# Patient Record
Sex: FEMALE | Race: WHITE | NOT HISPANIC OR LATINO
[De-identification: names, ages, dates, MRNs, and addresses within clinical notes are randomized per-mention and may not be internally consistent; named-entity substitution may affect disease eponyms.]

---

## 2018-08-28 ENCOUNTER — RX RENEWAL (OUTPATIENT)
Age: 53
End: 2018-08-28

## 2018-09-20 ENCOUNTER — RX RENEWAL (OUTPATIENT)
Age: 53
End: 2018-09-20

## 2018-10-30 ENCOUNTER — APPOINTMENT (OUTPATIENT)
Dept: HEART AND VASCULAR | Facility: CLINIC | Age: 53
End: 2018-10-30
Payer: COMMERCIAL

## 2018-10-30 VITALS — SYSTOLIC BLOOD PRESSURE: 130 MMHG | DIASTOLIC BLOOD PRESSURE: 80 MMHG

## 2018-10-30 PROCEDURE — 99211 OFF/OP EST MAY X REQ PHY/QHP: CPT | Mod: 25

## 2018-10-30 PROCEDURE — 36415 COLL VENOUS BLD VENIPUNCTURE: CPT

## 2018-10-31 ENCOUNTER — MED ADMIN CHARGE (OUTPATIENT)
Age: 53
End: 2018-10-31

## 2018-10-31 LAB
ALBUMIN SERPL ELPH-MCNC: 4.4 G/DL
ALP BLD-CCNC: 163 U/L
ALT SERPL-CCNC: 37 U/L
ANION GAP SERPL CALC-SCNC: 15 MMOL/L
AST SERPL-CCNC: 25 U/L
BASOPHILS # BLD AUTO: 0.02 K/UL
BASOPHILS NFR BLD AUTO: 0.3 %
BILIRUB SERPL-MCNC: 0.5 MG/DL
BUN SERPL-MCNC: 15 MG/DL
CALCIUM SERPL-MCNC: 9.9 MG/DL
CHLORIDE SERPL-SCNC: 100 MMOL/L
CHOLEST SERPL-MCNC: 291 MG/DL
CHOLEST/HDLC SERPL: 4.9 RATIO
CO2 SERPL-SCNC: 26 MMOL/L
CREAT SERPL-MCNC: 0.89 MG/DL
EOSINOPHIL # BLD AUTO: 0.27 K/UL
EOSINOPHIL NFR BLD AUTO: 4.6 %
GLUCOSE SERPL-MCNC: 98 MG/DL
HBA1C MFR BLD HPLC: 5.9 %
HBV SURFACE AB SER QL: NONREACTIVE
HCT VFR BLD CALC: 43.6 %
HDLC SERPL-MCNC: 60 MG/DL
HGB BLD-MCNC: 14.1 G/DL
IMM GRANULOCYTES NFR BLD AUTO: 0.2 %
LDLC SERPL CALC-MCNC: 180 MG/DL
LYMPHOCYTES # BLD AUTO: 2.46 K/UL
LYMPHOCYTES NFR BLD AUTO: 41.8 %
MAN DIFF?: NORMAL
MCHC RBC-ENTMCNC: 29.3 PG
MCHC RBC-ENTMCNC: 32.3 GM/DL
MCV RBC AUTO: 90.5 FL
MONOCYTES # BLD AUTO: 0.46 K/UL
MONOCYTES NFR BLD AUTO: 7.8 %
NEUTROPHILS # BLD AUTO: 2.66 K/UL
NEUTROPHILS NFR BLD AUTO: 45.3 %
PLATELET # BLD AUTO: 230 K/UL
POTASSIUM SERPL-SCNC: 4.2 MMOL/L
PROT SERPL-MCNC: 6.8 G/DL
RBC # BLD: 4.82 M/UL
RBC # FLD: 13.5 %
SODIUM SERPL-SCNC: 141 MMOL/L
T3 SERPL-MCNC: 102 NG/DL
T3FREE SERPL-MCNC: 2.94 PG/ML
T4 FREE SERPL-MCNC: 1 NG/DL
T4 SERPL-MCNC: 6.8 UG/DL
TRIGL SERPL-MCNC: 256 MG/DL
TSH SERPL-ACNC: 2.59 UIU/ML
WBC # FLD AUTO: 5.88 K/UL

## 2018-11-02 ENCOUNTER — OTHER (OUTPATIENT)
Age: 53
End: 2018-11-02

## 2018-11-02 LAB
AMPHET UR-MCNC: NEGATIVE
BARBITURATES UR-MCNC: NEGATIVE
BENZODIAZ UR-MCNC: NEGATIVE
COCAINE METAB.OTHER UR-MCNC: NEGATIVE
CREATININE, URINE: 114.5 MG/DL
M TB IFN-G BLD-IMP: NEGATIVE
METHADONE UR-MCNC: NEGATIVE
METHAQUALONE UR-MCNC: NEGATIVE
OPIATES UR-MCNC: NEGATIVE
PCP UR-MCNC: NEGATIVE
PROPOXYPH UR QL: NEGATIVE
QUANTIFERON TB PLUS MITOGEN MINUS NIL: 6.85 IU/ML
QUANTIFERON TB PLUS NIL: 0.02 IU/ML
QUANTIFERON TB PLUS TB1 MINUS NIL: 0.1 IU/ML
QUANTIFERON TB PLUS TB2 MINUS NIL: 0.08 IU/ML
THC UR QL: NEGATIVE

## 2018-11-20 ENCOUNTER — RX RENEWAL (OUTPATIENT)
Age: 53
End: 2018-11-20

## 2018-11-21 ENCOUNTER — RX RENEWAL (OUTPATIENT)
Age: 53
End: 2018-11-21

## 2018-12-28 ENCOUNTER — APPOINTMENT (OUTPATIENT)
Dept: HEART AND VASCULAR | Facility: CLINIC | Age: 53
End: 2018-12-28

## 2019-01-02 ENCOUNTER — APPOINTMENT (OUTPATIENT)
Dept: HEART AND VASCULAR | Facility: CLINIC | Age: 54
End: 2019-01-02

## 2019-01-04 ENCOUNTER — APPOINTMENT (OUTPATIENT)
Dept: HEART AND VASCULAR | Facility: CLINIC | Age: 54
End: 2019-01-04
Payer: COMMERCIAL

## 2019-01-04 VITALS — SYSTOLIC BLOOD PRESSURE: 132 MMHG | HEART RATE: 74 BPM | DIASTOLIC BLOOD PRESSURE: 80 MMHG

## 2019-01-04 PROCEDURE — 99213 OFFICE O/P EST LOW 20 MIN: CPT

## 2019-01-04 NOTE — PHYSICAL EXAM
[General Appearance - Well Developed] : well developed [Normal Appearance] : normal appearance [Well Groomed] : well groomed [General Appearance - Well Nourished] : well nourished [No Deformities] : no deformities [General Appearance - In No Acute Distress] : no acute distress [] : no respiratory distress [Respiration, Rhythm And Depth] : normal respiratory rhythm and effort [Exaggerated Use Of Accessory Muscles For Inspiration] : no accessory muscle use [Auscultation Breath Sounds / Voice Sounds] : lungs were clear to auscultation bilaterally [Heart Rate And Rhythm] : heart rate and rhythm were normal [Heart Sounds] : normal S1 and S2 [Murmurs] : no murmurs present

## 2019-01-04 NOTE — DISCUSSION/SUMMARY
[FreeTextEntry1] : No changes to medication regimen at this time.\par Pt to maintain low fat, low sodium diet, weight loss and exercise encouraged. \par FU with Dr Hallman in 4 months.\par

## 2019-01-04 NOTE — HISTORY OF PRESENT ILLNESS
[FreeTextEntry1] : 52yo female pt seen for FU of HTN. Pt denies c/o CP,palpitations, SOB, TYSON, nausea, vomiting, fatigue, unintentional weight loss. Pt takes all he medications as prescribed.

## 2019-01-07 ENCOUNTER — RX RENEWAL (OUTPATIENT)
Age: 54
End: 2019-01-07

## 2019-01-07 DIAGNOSIS — K21.9 GASTRO-ESOPHAGEAL REFLUX DISEASE W/OUT ESOPHAGITIS: ICD-10-CM

## 2019-02-06 ENCOUNTER — LABORATORY RESULT (OUTPATIENT)
Age: 54
End: 2019-02-06

## 2019-02-06 ENCOUNTER — APPOINTMENT (OUTPATIENT)
Dept: HEART AND VASCULAR | Facility: CLINIC | Age: 54
End: 2019-02-06

## 2019-02-06 ENCOUNTER — APPOINTMENT (OUTPATIENT)
Dept: HEART AND VASCULAR | Facility: CLINIC | Age: 54
End: 2019-02-06
Payer: COMMERCIAL

## 2019-02-06 VITALS
BODY MASS INDEX: 40.75 KG/M2 | HEART RATE: 72 BPM | DIASTOLIC BLOOD PRESSURE: 80 MMHG | HEIGHT: 63 IN | RESPIRATION RATE: 14 BRPM | WEIGHT: 230 LBS | SYSTOLIC BLOOD PRESSURE: 130 MMHG

## 2019-02-06 DIAGNOSIS — Z82.49 FAMILY HISTORY OF ISCHEMIC HEART DISEASE AND OTHER DISEASES OF THE CIRCULATORY SYSTEM: ICD-10-CM

## 2019-02-06 DIAGNOSIS — Z85.43 PERSONAL HISTORY OF MALIGNANT NEOPLASM OF OVARY: ICD-10-CM

## 2019-02-06 DIAGNOSIS — Z78.9 OTHER SPECIFIED HEALTH STATUS: ICD-10-CM

## 2019-02-06 DIAGNOSIS — J45.909 UNSPECIFIED ASTHMA, UNCOMPLICATED: ICD-10-CM

## 2019-02-06 DIAGNOSIS — Z83.3 FAMILY HISTORY OF DIABETES MELLITUS: ICD-10-CM

## 2019-02-06 PROCEDURE — 93000 ELECTROCARDIOGRAM COMPLETE: CPT

## 2019-02-06 PROCEDURE — 93306 TTE W/DOPPLER COMPLETE: CPT

## 2019-02-06 PROCEDURE — 36415 COLL VENOUS BLD VENIPUNCTURE: CPT

## 2019-02-06 PROCEDURE — 99214 OFFICE O/P EST MOD 30 MIN: CPT

## 2019-02-06 NOTE — PHYSICAL EXAM
[General Appearance - Well Developed] : well developed [Normal Appearance] : normal appearance [Well Groomed] : well groomed [General Appearance - Well Nourished] : well nourished [No Deformities] : no deformities [General Appearance - In No Acute Distress] : no acute distress [Normal Conjunctiva] : the conjunctiva exhibited no abnormalities [Eyelids - No Xanthelasma] : the eyelids demonstrated no xanthelasmas [Normal Oral Mucosa] : normal oral mucosa [No Oral Pallor] : no oral pallor [No Oral Cyanosis] : no oral cyanosis [Normal Jugular Venous A Waves Present] : normal jugular venous A waves present [Normal Jugular Venous V Waves Present] : normal jugular venous V waves present [No Jugular Venous Veliz A Waves] : no jugular venous veliz A waves [Respiration, Rhythm And Depth] : normal respiratory rhythm and effort [Exaggerated Use Of Accessory Muscles For Inspiration] : no accessory muscle use [Auscultation Breath Sounds / Voice Sounds] : lungs were clear to auscultation bilaterally [Heart Rate And Rhythm] : heart rate and rhythm were normal [Heart Sounds] : normal S1 and S2 [Systolic grade ___/6] : A grade [unfilled]/6 systolic murmur was heard. [Diastolic Grade ___/4] : A grade [unfilled]/4 diastolic murmur was heard. [Abdomen Soft] : soft [Abdomen Tenderness] : non-tender [Abdomen Mass (___ Cm)] : no abdominal mass palpated [Abnormal Walk] : normal gait [Nail Clubbing] : no clubbing of the fingernails [Cyanosis, Localized] : no localized cyanosis [Petechial Hemorrhages (___cm)] : no petechial hemorrhages [Skin Color & Pigmentation] : normal skin color and pigmentation [] : no rash [No Venous Stasis] : no venous stasis [Skin Lesions] : no skin lesions [No Skin Ulcers] : no skin ulcer [No Xanthoma] : no  xanthoma was observed [Oriented To Time, Place, And Person] : oriented to person, place, and time [Affect] : the affect was normal [Mood] : the mood was normal [No Anxiety] : not feeling anxious

## 2019-02-06 NOTE — DISCUSSION/SUMMARY
[Possible Cardiac Ischemia (Intermd Prob)] : possible cardiac ischemia (intermediate probability) [Dobutamine Stress Test] : dobutamine stress test [Hyperlipidemia] : hyperlipidemia [Diet Modification] : diet modification [Hypertension] : hypertension [Stable] : stable [None] : none [Weight Loss] : weight loss [Sodium Restriction] : sodium restriction [Patient] : the patient [FreeTextEntry1] : RHD- Stable; no further intervention at this time.\par Chest Pain- Given echo with relatively little change (Normal LV fxn, Mild MS; mod MR) will r/o underlying\par ischemic CAD (cardiac risk factors noted). Pt. cannot walk inclined treadmill due to chronic back pain, cannot take adenosine due to underlying asthma-> Dobutamine nuc stress ordered. \par Blood work drawn- Await results.

## 2019-02-06 NOTE — HISTORY OF PRESENT ILLNESS
[FreeTextEntry1] : The Patient complains of intermittent left-sided chest pain for a little over a week. The pain is described as aching and squeezing and is radiating to left arm. The pain is both exertional and non-exertional and is aggravated by Stress and relieved by rest. The pain isn't  associated with SOB/nausea/vomiting/diaphoresis/dizziness/palpitations.\par Echo ordered to assess LV function/possible worsening of known rheumatic valvular heart disease.\par

## 2019-02-06 NOTE — REASON FOR VISIT
[Follow-Up - Clinic] : a clinic follow-up of [Chest Pain] : chest pain [Hyperlipidemia] : hyperlipidemia [Hypertension] : hypertension [FreeTextEntry1] : Rheumatic heart disease (MR/MS)

## 2019-02-07 LAB
ALBUMIN SERPL ELPH-MCNC: 4.7 G/DL
ALP BLD-CCNC: 191 U/L
ALT SERPL-CCNC: 39 U/L
ANION GAP SERPL CALC-SCNC: 16 MMOL/L
AST SERPL-CCNC: 25 U/L
BASOPHILS # BLD AUTO: 0.03 K/UL
BASOPHILS NFR BLD AUTO: 0.4 %
BILIRUB SERPL-MCNC: 0.6 MG/DL
BUN SERPL-MCNC: 15 MG/DL
CALCIUM SERPL-MCNC: 9.8 MG/DL
CHLORIDE SERPL-SCNC: 103 MMOL/L
CO2 SERPL-SCNC: 24 MMOL/L
CREAT SERPL-MCNC: 0.82 MG/DL
EOSINOPHIL # BLD AUTO: 0.31 K/UL
EOSINOPHIL NFR BLD AUTO: 4 %
GLUCOSE SERPL-MCNC: 92 MG/DL
HBA1C MFR BLD HPLC: 5.9 %
HCT VFR BLD CALC: 44.7 %
HGB BLD-MCNC: 14.1 G/DL
IMM GRANULOCYTES NFR BLD AUTO: 0.3 %
LYMPHOCYTES # BLD AUTO: 2.56 K/UL
LYMPHOCYTES NFR BLD AUTO: 32.7 %
MAN DIFF?: NORMAL
MCHC RBC-ENTMCNC: 28.5 PG
MCHC RBC-ENTMCNC: 31.5 GM/DL
MCV RBC AUTO: 90.5 FL
MONOCYTES # BLD AUTO: 0.51 K/UL
MONOCYTES NFR BLD AUTO: 6.5 %
NEUTROPHILS # BLD AUTO: 4.4 K/UL
NEUTROPHILS NFR BLD AUTO: 56.1 %
PLATELET # BLD AUTO: 254 K/UL
POTASSIUM SERPL-SCNC: 4.2 MMOL/L
PROT SERPL-MCNC: 6.7 G/DL
RBC # BLD: 4.94 M/UL
RBC # FLD: 14.2 %
SODIUM SERPL-SCNC: 143 MMOL/L
WBC # FLD AUTO: 7.83 K/UL

## 2019-02-11 LAB
T3 SERPL-MCNC: 111 NG/DL
T3FREE SERPL-MCNC: 2.75 PG/ML
T3RU NFR SERPL: 1.12 INDEX
T4 FREE SERPL-MCNC: 1 NG/DL
T4 SERPL-MCNC: 6.8 UG/DL
TSH SERPL-ACNC: 2.17 UIU/ML

## 2019-02-17 ENCOUNTER — APPOINTMENT (OUTPATIENT)
Dept: HEART AND VASCULAR | Facility: CLINIC | Age: 54
End: 2019-02-17

## 2019-02-21 LAB
CHOLEST SERPL-MCNC: 263 MG/DL
CHOLEST/HDLC SERPL: 3.5 RATIO
HDLC SERPL-MCNC: 76 MG/DL
LDLC SERPL CALC-MCNC: 148 MG/DL
TRIGL SERPL-MCNC: 196 MG/DL

## 2019-03-11 ENCOUNTER — RX RENEWAL (OUTPATIENT)
Age: 54
End: 2019-03-11

## 2019-03-13 ENCOUNTER — RX RENEWAL (OUTPATIENT)
Age: 54
End: 2019-03-13

## 2019-04-09 ENCOUNTER — RX RENEWAL (OUTPATIENT)
Age: 54
End: 2019-04-09

## 2019-07-22 ENCOUNTER — RX RENEWAL (OUTPATIENT)
Age: 54
End: 2019-07-22

## 2019-08-08 ENCOUNTER — APPOINTMENT (OUTPATIENT)
Dept: HEART AND VASCULAR | Facility: CLINIC | Age: 54
End: 2019-08-08

## 2019-08-14 ENCOUNTER — APPOINTMENT (OUTPATIENT)
Dept: HEART AND VASCULAR | Facility: CLINIC | Age: 54
End: 2019-08-14

## 2019-09-09 ENCOUNTER — RX RENEWAL (OUTPATIENT)
Age: 54
End: 2019-09-09

## 2019-09-09 RX ORDER — LOSARTAN POTASSIUM AND HYDROCHLOROTHIAZIDE 12.5; 5 MG/1; MG/1
50-12.5 TABLET ORAL DAILY
Qty: 30 | Refills: 3 | Status: DISCONTINUED | COMMUNITY
Start: 2018-08-28 | End: 2019-09-09

## 2019-09-10 RX ORDER — PANTOPRAZOLE 40 MG/1
40 TABLET, DELAYED RELEASE ORAL
Qty: 30 | Refills: 3 | Status: DISCONTINUED | COMMUNITY
Start: 2019-01-07 | End: 2019-09-10

## 2019-09-18 ENCOUNTER — RX RENEWAL (OUTPATIENT)
Age: 54
End: 2019-09-18

## 2019-09-18 RX ORDER — ESOMEPRAZOLE MAGNESIUM 20 MG/1
20 CAPSULE, DELAYED RELEASE ORAL DAILY
Qty: 180 | Refills: 3 | Status: COMPLETED | COMMUNITY
Start: 2019-09-10 | End: 2019-09-18

## 2019-09-19 ENCOUNTER — RX RENEWAL (OUTPATIENT)
Age: 54
End: 2019-09-19

## 2019-09-20 ENCOUNTER — RX RENEWAL (OUTPATIENT)
Age: 54
End: 2019-09-20

## 2019-10-07 ENCOUNTER — RX RENEWAL (OUTPATIENT)
Age: 54
End: 2019-10-07

## 2019-10-14 ENCOUNTER — LABORATORY RESULT (OUTPATIENT)
Age: 54
End: 2019-10-14

## 2019-10-15 ENCOUNTER — APPOINTMENT (OUTPATIENT)
Dept: HEART AND VASCULAR | Facility: CLINIC | Age: 54
End: 2019-10-15
Payer: COMMERCIAL

## 2019-10-15 VITALS
DIASTOLIC BLOOD PRESSURE: 90 MMHG | OXYGEN SATURATION: 100 % | SYSTOLIC BLOOD PRESSURE: 150 MMHG | BODY MASS INDEX: 40.22 KG/M2 | HEART RATE: 70 BPM | HEIGHT: 63 IN | WEIGHT: 227 LBS

## 2019-10-15 PROCEDURE — 99213 OFFICE O/P EST LOW 20 MIN: CPT | Mod: 25

## 2019-10-15 PROCEDURE — 36415 COLL VENOUS BLD VENIPUNCTURE: CPT

## 2019-10-15 NOTE — HISTORY OF PRESENT ILLNESS
[de-identified] : Pt presents for routine job physical, to FU on HTN, HLD, vit D deficiency.\par Pt denies CP, palpitations, fever, chills, SOB, difficulty breathing. 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

## 2019-10-15 NOTE — PLAN
[FreeTextEntry1] : HTN: pt is encouraged to continue taking medications as prescribed without interruptions. Pt will monitor her BP for 1 week and will keep a log.\par HLD: continue current medications as prescribed; maintain low fat/low sodium diet. \par Work form: BW obtained >> pending results

## 2019-10-17 ENCOUNTER — RX RENEWAL (OUTPATIENT)
Age: 54
End: 2019-10-17

## 2019-10-17 LAB
25(OH)D3 SERPL-MCNC: 16.1 NG/ML
ALBUMIN SERPL ELPH-MCNC: 4.3 G/DL
ALP BLD-CCNC: 162 U/L
ALT SERPL-CCNC: 48 U/L
AMPHET UR-MCNC: NEGATIVE
ANION GAP SERPL CALC-SCNC: 13 MMOL/L
AST SERPL-CCNC: 40 U/L
BARBITURATES UR-MCNC: NEGATIVE
BASOPHILS # BLD AUTO: 0.04 K/UL
BASOPHILS NFR BLD AUTO: 0.7 %
BENZODIAZ UR-MCNC: NEGATIVE
BILIRUB SERPL-MCNC: 0.5 MG/DL
BUN SERPL-MCNC: 13 MG/DL
CALCIUM SERPL-MCNC: 9.8 MG/DL
CHLORIDE SERPL-SCNC: 106 MMOL/L
CHOLEST SERPL-MCNC: 294 MG/DL
CHOLEST/HDLC SERPL: 4.8 RATIO
CO2 SERPL-SCNC: 22 MMOL/L
COCAINE METAB.OTHER UR-MCNC: NEGATIVE
CREAT SERPL-MCNC: 0.93 MG/DL
CREATININE, URINE: 36.5 MG/DL
EOSINOPHIL # BLD AUTO: 0.36 K/UL
EOSINOPHIL NFR BLD AUTO: 6.7 %
ESTIMATED AVERAGE GLUCOSE: 120 MG/DL
GLUCOSE SERPL-MCNC: 97 MG/DL
HBA1C MFR BLD HPLC: 5.8 %
HCT VFR BLD CALC: 42.3 %
HDLC SERPL-MCNC: 61 MG/DL
HGB BLD-MCNC: 13.6 G/DL
IMM GRANULOCYTES NFR BLD AUTO: 0.2 %
LDLC SERPL CALC-MCNC: 191 MG/DL
LYMPHOCYTES # BLD AUTO: 2.16 K/UL
LYMPHOCYTES NFR BLD AUTO: 40.4 %
M TB IFN-G BLD-IMP: NEGATIVE
MAN DIFF?: NORMAL
MCHC RBC-ENTMCNC: 28.9 PG
MCHC RBC-ENTMCNC: 32.2 GM/DL
MCV RBC AUTO: 89.8 FL
METHADONE UR-MCNC: NEGATIVE
METHAQUALONE UR-MCNC: NEGATIVE
MONOCYTES # BLD AUTO: 0.36 K/UL
MONOCYTES NFR BLD AUTO: 6.7 %
NEUTROPHILS # BLD AUTO: 2.41 K/UL
NEUTROPHILS NFR BLD AUTO: 45.3 %
OPIATES UR-MCNC: NEGATIVE
PCP UR-MCNC: NEGATIVE
PLATELET # BLD AUTO: 233 K/UL
POTASSIUM SERPL-SCNC: 4 MMOL/L
PROPOXYPH UR QL: NEGATIVE
PROT SERPL-MCNC: 6.4 G/DL
QUANTIFERON TB PLUS MITOGEN MINUS NIL: >10 IU/ML
QUANTIFERON TB PLUS NIL: 0.05 IU/ML
QUANTIFERON TB PLUS TB1 MINUS NIL: 0.02 IU/ML
QUANTIFERON TB PLUS TB2 MINUS NIL: 0 IU/ML
RBC # BLD: 4.71 M/UL
RBC # FLD: 13.4 %
SODIUM SERPL-SCNC: 141 MMOL/L
T3 SERPL-MCNC: 91 NG/DL
T4 FREE SERPL-MCNC: 0.9 NG/DL
T4 SERPL-MCNC: 5.2 UG/DL
THC UR QL: NEGATIVE
TRIGL SERPL-MCNC: 212 MG/DL
TSH SERPL-ACNC: 1.72 UIU/ML
WBC # FLD AUTO: 5.34 K/UL

## 2019-11-08 ENCOUNTER — RX RENEWAL (OUTPATIENT)
Age: 54
End: 2019-11-08

## 2019-11-26 ENCOUNTER — RX RENEWAL (OUTPATIENT)
Age: 54
End: 2019-11-26

## 2019-12-24 ENCOUNTER — RX RENEWAL (OUTPATIENT)
Age: 54
End: 2019-12-24

## 2020-01-14 ENCOUNTER — APPOINTMENT (OUTPATIENT)
Dept: HEART AND VASCULAR | Facility: CLINIC | Age: 55
End: 2020-01-14

## 2020-06-15 RX ORDER — FLUOROMETHOLONE 1 MG/G
0.1 OINTMENT OPHTHALMIC
Qty: 1 | Refills: 0 | Status: ACTIVE | COMMUNITY
Start: 2019-09-09 | End: 1900-01-01

## 2020-06-17 ENCOUNTER — RX RENEWAL (OUTPATIENT)
Age: 55
End: 2020-06-17

## 2020-09-16 ENCOUNTER — APPOINTMENT (OUTPATIENT)
Dept: HEART AND VASCULAR | Facility: CLINIC | Age: 55
End: 2020-09-16
Payer: COMMERCIAL

## 2020-09-16 ENCOUNTER — NON-APPOINTMENT (OUTPATIENT)
Age: 55
End: 2020-09-16

## 2020-09-16 VITALS
WEIGHT: 230 LBS | DIASTOLIC BLOOD PRESSURE: 88 MMHG | SYSTOLIC BLOOD PRESSURE: 150 MMHG | BODY MASS INDEX: 40.75 KG/M2 | HEIGHT: 63 IN

## 2020-09-16 DIAGNOSIS — F41.9 ANXIETY DISORDER, UNSPECIFIED: ICD-10-CM

## 2020-09-16 PROCEDURE — 99214 OFFICE O/P EST MOD 30 MIN: CPT | Mod: 25

## 2020-09-16 PROCEDURE — 36415 COLL VENOUS BLD VENIPUNCTURE: CPT

## 2020-09-16 PROCEDURE — 93000 ELECTROCARDIOGRAM COMPLETE: CPT

## 2020-09-16 RX ORDER — LOSARTAN POTASSIUM 50 MG/1
50 TABLET, FILM COATED ORAL
Qty: 90 | Refills: 3 | Status: DISCONTINUED | COMMUNITY
Start: 2019-09-09 | End: 2020-09-16

## 2020-09-17 LAB
25(OH)D3 SERPL-MCNC: 27.3 NG/ML
ALBUMIN SERPL ELPH-MCNC: 4.6 G/DL
ALP BLD-CCNC: 150 U/L
ALT SERPL-CCNC: 32 U/L
ANION GAP SERPL CALC-SCNC: 14 MMOL/L
APPEARANCE: CLEAR
AST SERPL-CCNC: 25 U/L
BACTERIA: NEGATIVE
BASOPHILS # BLD AUTO: 0.05 K/UL
BASOPHILS NFR BLD AUTO: 0.9 %
BILIRUB SERPL-MCNC: 0.6 MG/DL
BILIRUBIN URINE: NEGATIVE
BLOOD URINE: NEGATIVE
BUN SERPL-MCNC: 13 MG/DL
CALCIUM SERPL-MCNC: 9.7 MG/DL
CHLORIDE SERPL-SCNC: 104 MMOL/L
CHOLEST SERPL-MCNC: 290 MG/DL
CHOLEST/HDLC SERPL: 3.6 RATIO
CO2 SERPL-SCNC: 23 MMOL/L
COLOR: COLORLESS
CREAT SERPL-MCNC: 0.91 MG/DL
EOSINOPHIL # BLD AUTO: 0.27 K/UL
EOSINOPHIL NFR BLD AUTO: 4.8 %
ESTIMATED AVERAGE GLUCOSE: 114 MG/DL
FOLATE SERPL-MCNC: 3.8 NG/ML
GLUCOSE QUALITATIVE U: NEGATIVE
GLUCOSE SERPL-MCNC: 92 MG/DL
HBA1C MFR BLD HPLC: 5.6 %
HCT VFR BLD CALC: 45.5 %
HDLC SERPL-MCNC: 81 MG/DL
HGB BLD-MCNC: 14.5 G/DL
HYALINE CASTS: 0 /LPF
IMM GRANULOCYTES NFR BLD AUTO: 0.2 %
KETONES URINE: NEGATIVE
LDLC SERPL CALC-MCNC: 178 MG/DL
LEUKOCYTE ESTERASE URINE: NEGATIVE
LYMPHOCYTES # BLD AUTO: 1.96 K/UL
LYMPHOCYTES NFR BLD AUTO: 34.9 %
MAN DIFF?: NORMAL
MCHC RBC-ENTMCNC: 29.4 PG
MCHC RBC-ENTMCNC: 31.9 GM/DL
MCV RBC AUTO: 92.1 FL
MICROSCOPIC-UA: NORMAL
MONOCYTES # BLD AUTO: 0.43 K/UL
MONOCYTES NFR BLD AUTO: 7.7 %
NEUTROPHILS # BLD AUTO: 2.9 K/UL
NEUTROPHILS NFR BLD AUTO: 51.5 %
NITRITE URINE: NEGATIVE
PH URINE: 6.5
PLATELET # BLD AUTO: 225 K/UL
POTASSIUM SERPL-SCNC: 3.9 MMOL/L
PROT SERPL-MCNC: 6.4 G/DL
PROTEIN URINE: NEGATIVE
RBC # BLD: 4.94 M/UL
RBC # FLD: 14 %
RED BLOOD CELLS URINE: 1 /HPF
SARS-COV-2 IGG SERPL IA-ACNC: 0.08 INDEX
SARS-COV-2 IGG SERPL QL IA: NEGATIVE
SODIUM SERPL-SCNC: 141 MMOL/L
SPECIFIC GRAVITY URINE: 1.01
SQUAMOUS EPITHELIAL CELLS: 0 /HPF
T3 SERPL-MCNC: 84 NG/DL
T4 FREE SERPL-MCNC: 1 NG/DL
T4 SERPL-MCNC: 6.1 UG/DL
TRIGL SERPL-MCNC: 154 MG/DL
TSH SERPL-ACNC: 1.77 UIU/ML
UROBILINOGEN URINE: NORMAL
VIT B12 SERPL-MCNC: 564 PG/ML
WBC # FLD AUTO: 5.62 K/UL
WHITE BLOOD CELLS URINE: 0 /HPF

## 2020-09-20 LAB
AMPHET UR-MCNC: NEGATIVE
BACTERIA UR CULT: NORMAL
BARBITURATES UR-MCNC: NEGATIVE
BENZODIAZ UR-MCNC: NEGATIVE
COCAINE METAB.OTHER UR-MCNC: NEGATIVE
CREATININE, URINE: 27.5 MG/DL
M TB IFN-G BLD-IMP: NEGATIVE
METHADONE UR-MCNC: NEGATIVE
METHAQUALONE UR-MCNC: NEGATIVE
OPIATES UR-MCNC: NEGATIVE
PCP UR-MCNC: NEGATIVE
PROPOXYPH UR QL: NEGATIVE
QUANTIFERON TB PLUS MITOGEN MINUS NIL: 7.02 IU/ML
QUANTIFERON TB PLUS NIL: 0.04 IU/ML
QUANTIFERON TB PLUS TB1 MINUS NIL: 0 IU/ML
QUANTIFERON TB PLUS TB2 MINUS NIL: 0 IU/ML
THC UR QL: NEGATIVE

## 2020-09-22 NOTE — PLAN
[FreeTextEntry1] : HTN: continue medications as prescribed\par HLD: low fat/low sodium diet, weight loss and low impact exercise is discussed and encouraged. \par Vit D deficiency: continue supplementation\par I discussed with pt importance of hand washing, social distancing, wearing appropriate face covering at all times. \par Symptoms and implications of COVID 19 discussed with good understanding.

## 2020-09-22 NOTE — HISTORY OF PRESENT ILLNESS
[de-identified] : Pt presents for routine work physical, to FU on HTN, HLD, vit D deficiency.\par Pt denies CP, palpitations, fever, chills. Pt is overweight and has SOB with climbing stairs. \par Pt has good appetite; regular BMs; denies sleep alterations, cough, hemoptysis, night sweats, unintentional weight loss . \par Pt is up to date with annual mammograms; refused flu shot this season and opted to wearing the mask.\par

## 2020-12-31 ENCOUNTER — APPOINTMENT (OUTPATIENT)
Dept: HEART AND VASCULAR | Facility: CLINIC | Age: 55
End: 2020-12-31
Payer: COMMERCIAL

## 2020-12-31 ENCOUNTER — MED ADMIN CHARGE (OUTPATIENT)
Age: 55
End: 2020-12-31

## 2020-12-31 PROCEDURE — 99072 ADDL SUPL MATRL&STAF TM PHE: CPT

## 2020-12-31 PROCEDURE — 99212 OFFICE O/P EST SF 10 MIN: CPT | Mod: 25

## 2020-12-31 PROCEDURE — 90471 IMMUNIZATION ADMIN: CPT | Mod: 59

## 2020-12-31 PROCEDURE — 90682 RIV4 VACC RECOMBINANT DNA IM: CPT

## 2021-02-09 VITALS
SYSTOLIC BLOOD PRESSURE: 150 MMHG | HEIGHT: 63 IN | BODY MASS INDEX: 40.75 KG/M2 | WEIGHT: 230 LBS | DIASTOLIC BLOOD PRESSURE: 88 MMHG

## 2021-02-09 NOTE — PHYSICAL EXAM
[General Appearance - Well Developed] : well developed [Normal Appearance] : normal appearance [Well Groomed] : well groomed [General Appearance - Well Nourished] : well nourished [No Deformities] : no deformities [General Appearance - In No Acute Distress] : no acute distress [Normal Conjunctiva] : the conjunctiva exhibited no abnormalities [Eyelids - No Xanthelasma] : the eyelids demonstrated no xanthelasmas [Normal Oral Mucosa] : normal oral mucosa [No Oral Pallor] : no oral pallor [No Oral Cyanosis] : no oral cyanosis [Normal Jugular Venous A Waves Present] : normal jugular venous A waves present [Normal Jugular Venous V Waves Present] : normal jugular venous V waves present [No Jugular Venous Veliz A Waves] : no jugular venous veliz A waves [] : no respiratory distress [Respiration, Rhythm And Depth] : normal respiratory rhythm and effort [Exaggerated Use Of Accessory Muscles For Inspiration] : no accessory muscle use [Auscultation Breath Sounds / Voice Sounds] : lungs were clear to auscultation bilaterally [Heart Sounds] : normal S1 and S2 [Heart Rate And Rhythm] : heart rate and rhythm were normal [Murmurs] : no murmurs present

## 2021-02-09 NOTE — ASSESSMENT
[FreeTextEntry1] : pt seen for annual flu shot administration. Flu shot administered, no local reaction noted. Possible SE reviewed with the patient, good understanding verbalized.\par \par

## 2021-02-12 DIAGNOSIS — R07.9 CHEST PAIN, UNSPECIFIED: ICD-10-CM

## 2021-02-15 LAB
SARS-COV-2 IGG SERPL IA-ACNC: 0.06 INDEX
SARS-COV-2 IGG SERPL QL IA: NEGATIVE
SARS-COV-2 N GENE NPH QL NAA+PROBE: NOT DETECTED

## 2021-06-18 ENCOUNTER — APPOINTMENT (OUTPATIENT)
Dept: HEART AND VASCULAR | Facility: CLINIC | Age: 56
End: 2021-06-18

## 2021-06-22 ENCOUNTER — APPOINTMENT (OUTPATIENT)
Dept: HEART AND VASCULAR | Facility: CLINIC | Age: 56
End: 2021-06-22
Payer: COMMERCIAL

## 2021-06-22 VITALS
BODY MASS INDEX: 38.09 KG/M2 | SYSTOLIC BLOOD PRESSURE: 118 MMHG | HEIGHT: 63 IN | DIASTOLIC BLOOD PRESSURE: 72 MMHG | WEIGHT: 215 LBS

## 2021-06-22 DIAGNOSIS — G89.29 DORSALGIA, UNSPECIFIED: ICD-10-CM

## 2021-06-22 DIAGNOSIS — M54.9 DORSALGIA, UNSPECIFIED: ICD-10-CM

## 2021-06-22 PROCEDURE — 36415 COLL VENOUS BLD VENIPUNCTURE: CPT

## 2021-06-22 PROCEDURE — 99213 OFFICE O/P EST LOW 20 MIN: CPT | Mod: 25

## 2021-06-23 PROBLEM — M54.9 BACK PAIN, CHRONIC: Status: ACTIVE | Noted: 2019-02-06

## 2021-06-23 LAB
25(OH)D3 SERPL-MCNC: 23.5 NG/ML
ALBUMIN SERPL ELPH-MCNC: 4.4 G/DL
ALP BLD-CCNC: 169 U/L
ALT SERPL-CCNC: 26 U/L
ANION GAP SERPL CALC-SCNC: 11 MMOL/L
AST SERPL-CCNC: 28 U/L
BASOPHILS # BLD AUTO: 0.05 K/UL
BASOPHILS NFR BLD AUTO: 0.9 %
BILIRUB SERPL-MCNC: 0.5 MG/DL
BUN SERPL-MCNC: 12 MG/DL
CALCIUM SERPL-MCNC: 10 MG/DL
CHLORIDE SERPL-SCNC: 100 MMOL/L
CHOLEST SERPL-MCNC: 270 MG/DL
CO2 SERPL-SCNC: 24 MMOL/L
CREAT SERPL-MCNC: 0.98 MG/DL
EOSINOPHIL # BLD AUTO: 0.39 K/UL
EOSINOPHIL NFR BLD AUTO: 6.8 %
ESTIMATED AVERAGE GLUCOSE: 120 MG/DL
FOLATE SERPL-MCNC: 5.1 NG/ML
GLUCOSE SERPL-MCNC: 102 MG/DL
HBA1C MFR BLD HPLC: 5.8 %
HCT VFR BLD CALC: 42.2 %
HDLC SERPL-MCNC: 66 MG/DL
HGB BLD-MCNC: 13.8 G/DL
IMM GRANULOCYTES NFR BLD AUTO: 0.2 %
LDLC SERPL CALC-MCNC: 159 MG/DL
LYMPHOCYTES # BLD AUTO: 2.26 K/UL
LYMPHOCYTES NFR BLD AUTO: 39.3 %
MAN DIFF?: NORMAL
MCHC RBC-ENTMCNC: 28.8 PG
MCHC RBC-ENTMCNC: 32.7 GM/DL
MCV RBC AUTO: 88.1 FL
MONOCYTES # BLD AUTO: 0.47 K/UL
MONOCYTES NFR BLD AUTO: 8.2 %
NEUTROPHILS # BLD AUTO: 2.57 K/UL
NEUTROPHILS NFR BLD AUTO: 44.6 %
NONHDLC SERPL-MCNC: 204 MG/DL
PLATELET # BLD AUTO: 272 K/UL
POTASSIUM SERPL-SCNC: 4 MMOL/L
PROT SERPL-MCNC: 6.5 G/DL
RBC # BLD: 4.79 M/UL
RBC # FLD: 13.3 %
SODIUM SERPL-SCNC: 136 MMOL/L
T3 SERPL-MCNC: 103 NG/DL
T4 FREE SERPL-MCNC: 1.1 NG/DL
T4 SERPL-MCNC: 6.7 UG/DL
TRIGL SERPL-MCNC: 222 MG/DL
TSH SERPL-ACNC: 2.37 UIU/ML
VIT B12 SERPL-MCNC: >2000 PG/ML
WBC # FLD AUTO: 5.75 K/UL

## 2021-06-23 NOTE — HISTORY OF PRESENT ILLNESS
[FreeTextEntry1] : Feet pain\par Low back pain\par \par Hair loss\par  [de-identified] : Pt denies c/o fever, SOB, difficulty breathing, loss of sense of smell or taste. Pt has not traveled outside the state or country; denies contacts with sick persons or confirmed COVID 19 cases. \par Low back pain is chronic but exacerbated recently; pain is traveling down the left leg and is consistent with sciatica pain. Also reports pain in both feet in the morning; does not interfere with her ability to walk. She describes it a stiffness and it improves during the day. \par

## 2021-06-23 NOTE — PLAN
[FreeTextEntry1] : Back pain/sciatica: Medrol pack as prescribed. \par HTN: appears well controlled with current medications. Pt improved her diet and seems to be loosing weight; \par HLD: will maintain low fat/low sodium diet and continue weight loss.\par BW obtained in the office today, pending results. \par Discussed morning exercises for feet stiffness and evaluation at podiatrist.\par ROutine FU

## 2021-06-29 DIAGNOSIS — E54 ASCORBIC ACID DEFICIENCY: ICD-10-CM

## 2021-06-29 LAB
B BURGDOR AB SER-IMP: NEGATIVE
B BURGDOR IGM PATRN SER IB-IMP: NEGATIVE
B BURGDOR18KD IGG SER QL IB: NORMAL
B BURGDOR23KD IGG SER QL IB: PRESENT
B BURGDOR23KD IGM SER QL IB: NORMAL
B BURGDOR28KD IGG SER QL IB: NORMAL
B BURGDOR30KD IGG SER QL IB: PRESENT
B BURGDOR31KD IGG SER QL IB: NORMAL
B BURGDOR39KD IGG SER QL IB: NORMAL
B BURGDOR39KD IGM SER QL IB: NORMAL
B BURGDOR41KD IGG SER QL IB: NORMAL
B BURGDOR41KD IGM SER QL IB: NORMAL
B BURGDOR45KD IGG SER QL IB: NORMAL
B BURGDOR58KD IGG SER QL IB: NORMAL
B BURGDOR66KD IGG SER QL IB: NORMAL
B BURGDOR93KD IGG SER QL IB: NORMAL
VIT A SERPL-MCNC: 41.7 UG/DL
VIT C SERPL-MCNC: 0.1 MG/DL

## 2021-08-24 ENCOUNTER — APPOINTMENT (OUTPATIENT)
Dept: HEART AND VASCULAR | Facility: CLINIC | Age: 56
End: 2021-08-24
Payer: COMMERCIAL

## 2021-08-24 PROCEDURE — 99212 OFFICE O/P EST SF 10 MIN: CPT

## 2021-08-25 LAB
MEV IGG FLD QL IA: 125 AU/ML
MEV IGG+IGM SER-IMP: POSITIVE
MUV AB SER-ACNC: POSITIVE
MUV IGG SER QL IA: >300 AU/ML
RUBV IGG FLD-ACNC: >33 INDEX
RUBV IGG SER-IMP: POSITIVE

## 2021-08-27 LAB
AMPHET UR-MCNC: NEGATIVE
BARBITURATES UR-MCNC: NEGATIVE
BENZODIAZ UR-MCNC: NEGATIVE
COCAINE METAB.OTHER UR-MCNC: NEGATIVE
CREATININE, URINE: 250.1 MG/DL
M TB IFN-G BLD-IMP: NEGATIVE
METHADONE UR-MCNC: NEGATIVE
METHAQUALONE UR-MCNC: NEGATIVE
OPIATES UR-MCNC: NEGATIVE
PCP UR-MCNC: NEGATIVE
PROPOXYPH UR QL: NEGATIVE
QUANTIFERON TB PLUS MITOGEN MINUS NIL: 7.73 IU/ML
QUANTIFERON TB PLUS NIL: 0.05 IU/ML
QUANTIFERON TB PLUS TB1 MINUS NIL: 0.03 IU/ML
QUANTIFERON TB PLUS TB2 MINUS NIL: 0.02 IU/ML
THC UR QL: NEGATIVE

## 2021-08-31 NOTE — PLAN
[FreeTextEntry1] : BW obtained in the office today, pending results. \par Urine sample obtained for urine drug screen. \par Form is pending completion upon BW results.

## 2021-12-29 ENCOUNTER — APPOINTMENT (OUTPATIENT)
Dept: HEART AND VASCULAR | Facility: CLINIC | Age: 56
End: 2021-12-29

## 2022-01-04 LAB
RAPID RVP RESULT: DETECTED
SARS-COV-2 RNA PNL RESP NAA+PROBE: DETECTED

## 2022-01-28 ENCOUNTER — APPOINTMENT (OUTPATIENT)
Dept: HEART AND VASCULAR | Facility: CLINIC | Age: 57
End: 2022-01-28

## 2022-02-28 ENCOUNTER — APPOINTMENT (OUTPATIENT)
Dept: HEART AND VASCULAR | Facility: CLINIC | Age: 57
End: 2022-02-28

## 2022-03-01 ENCOUNTER — APPOINTMENT (OUTPATIENT)
Dept: HEART AND VASCULAR | Facility: CLINIC | Age: 57
End: 2022-03-01
Payer: COMMERCIAL

## 2022-03-01 ENCOUNTER — NON-APPOINTMENT (OUTPATIENT)
Age: 57
End: 2022-03-01

## 2022-03-01 ENCOUNTER — LABORATORY RESULT (OUTPATIENT)
Age: 57
End: 2022-03-01

## 2022-03-01 ENCOUNTER — APPOINTMENT (OUTPATIENT)
Dept: HEART AND VASCULAR | Facility: CLINIC | Age: 57
End: 2022-03-01

## 2022-03-01 VITALS
RESPIRATION RATE: 14 BRPM | SYSTOLIC BLOOD PRESSURE: 170 MMHG | BODY MASS INDEX: 38.98 KG/M2 | WEIGHT: 220 LBS | HEART RATE: 41 BPM | DIASTOLIC BLOOD PRESSURE: 90 MMHG | HEIGHT: 63 IN

## 2022-03-01 DIAGNOSIS — Z00.00 ENCOUNTER FOR GENERAL ADULT MEDICAL EXAMINATION W/OUT ABNORMAL FINDINGS: ICD-10-CM

## 2022-03-01 DIAGNOSIS — I09.9 RHEUMATIC HEART DISEASE, UNSPECIFIED: ICD-10-CM

## 2022-03-01 PROCEDURE — 99214 OFFICE O/P EST MOD 30 MIN: CPT

## 2022-03-01 PROCEDURE — 93306 TTE W/DOPPLER COMPLETE: CPT

## 2022-03-01 PROCEDURE — 99214 OFFICE O/P EST MOD 30 MIN: CPT | Mod: 25

## 2022-03-01 PROCEDURE — ZZZZZ: CPT

## 2022-03-01 PROCEDURE — 36415 COLL VENOUS BLD VENIPUNCTURE: CPT

## 2022-03-01 PROCEDURE — 93000 ELECTROCARDIOGRAM COMPLETE: CPT

## 2022-03-01 NOTE — HISTORY OF PRESENT ILLNESS
[FreeTextEntry1] : Denies Chest Pain, SOB, Dizziness, Leg edema, Orthopnea, PND, Palpitations, Syncope, TYSON, Diaphoresis.\par HTN/ Known rheumatic VHD- Echo ordered to assess LV function/possible progression of valvular heart disease.

## 2022-03-01 NOTE — PHYSICAL EXAM

## 2022-03-01 NOTE — ADDENDUM
[FreeTextEntry1] : Documented by Armand Gold acting as a scribe for Dr. Kulwinder Hallman on 03/01/2022.

## 2022-03-01 NOTE — DISCUSSION/SUMMARY
[Hyperlipidemia] : hyperlipidemia [Diet Modification] : diet modification [Hypertension] : hypertension [Stable] : stable [None] : There are no changes in medication management [Low Sodium Diet] : low sodium diet [NSAIDs Avoidance] : non-steroidal anti-inflammatory drugs avoidance [Patient] : the patient [FreeTextEntry1] : RHD- Stable; no further intervention at this time; Mild MS noted, no change from Echo 2019 (see echo).\par Blood work drawn. Maintain a low caloric, low sodium, low cholesterol diet. Dietary counseling given, diet and exercise discussed in detail.\par Pt. with c/o chronic Lower back pain; pain persists despite injections/pain management-> pt. referred for MRI lower back with contrast and possible neurosurgery consultation.

## 2022-03-01 NOTE — END OF VISIT
[FreeTextEntry3] : All medical record entries made by the Scribe were at my, Dr. Kulwinder Hallman, direction and personally dictated by me on 03/01/2022. I have reviewed the chart and agree that the record accurately reflects my personal performance of the history, physical exam, assessment and plan. I have also personally directed, reviewed, and agreed with the chart. [Time Spent: ___ minutes] : I have spent [unfilled] minutes of time on the encounter.

## 2022-03-02 LAB
25(OH)D3 SERPL-MCNC: 31.2 NG/ML
ALBUMIN SERPL ELPH-MCNC: 4.3 G/DL
ALP BLD-CCNC: 151 U/L
ALT SERPL-CCNC: 40 U/L
ANION GAP SERPL CALC-SCNC: 13 MMOL/L
AST SERPL-CCNC: 34 U/L
BASOPHILS # BLD AUTO: 0.04 K/UL
BASOPHILS NFR BLD AUTO: 0.8 %
BILIRUB SERPL-MCNC: 0.6 MG/DL
BUN SERPL-MCNC: 13 MG/DL
CALCIUM SERPL-MCNC: 9.9 MG/DL
CHLORIDE SERPL-SCNC: 105 MMOL/L
CHOLEST SERPL-MCNC: 296 MG/DL
CO2 SERPL-SCNC: 22 MMOL/L
COVID-19 SPIKE DOMAIN ANTIBODY INTERPRETATION: POSITIVE
CREAT SERPL-MCNC: 0.94 MG/DL
EGFR: 70 ML/MIN/1.73M2
EOSINOPHIL # BLD AUTO: 0.24 K/UL
EOSINOPHIL NFR BLD AUTO: 4.9 %
ESTIMATED AVERAGE GLUCOSE: 117 MG/DL
FOLATE SERPL-MCNC: 4 NG/ML
GLUCOSE SERPL-MCNC: 95 MG/DL
HBA1C MFR BLD HPLC: 5.7 %
HCT VFR BLD CALC: 42.6 %
HDLC SERPL-MCNC: 71 MG/DL
HGB BLD-MCNC: 14.1 G/DL
IMM GRANULOCYTES NFR BLD AUTO: 0.2 %
LDLC SERPL CALC-MCNC: 188 MG/DL
LYMPHOCYTES # BLD AUTO: 1.87 K/UL
LYMPHOCYTES NFR BLD AUTO: 38 %
MAN DIFF?: NORMAL
MCHC RBC-ENTMCNC: 29.3 PG
MCHC RBC-ENTMCNC: 33.1 GM/DL
MCV RBC AUTO: 88.6 FL
MONOCYTES # BLD AUTO: 0.33 K/UL
MONOCYTES NFR BLD AUTO: 6.7 %
NEUTROPHILS # BLD AUTO: 2.43 K/UL
NEUTROPHILS NFR BLD AUTO: 49.4 %
NONHDLC SERPL-MCNC: 225 MG/DL
PLATELET # BLD AUTO: 236 K/UL
POTASSIUM SERPL-SCNC: 4.1 MMOL/L
PROT SERPL-MCNC: 6.3 G/DL
RBC # BLD: 4.81 M/UL
RBC # FLD: 13.5 %
SARS-COV-2 AB SERPL IA-ACNC: >250 U/ML
SODIUM SERPL-SCNC: 141 MMOL/L
T3 SERPL-MCNC: 85 NG/DL
T3FREE SERPL-MCNC: 2.42 PG/ML
T3RU NFR SERPL: 1.1 TBI
T4 FREE SERPL-MCNC: 1 NG/DL
T4 SERPL-MCNC: 6.4 UG/DL
TRIGL SERPL-MCNC: 184 MG/DL
TSH SERPL-ACNC: 0.98 UIU/ML
VIT B12 SERPL-MCNC: 1017 PG/ML
WBC # FLD AUTO: 4.92 K/UL

## 2022-03-07 RX ORDER — ATORVASTATIN CALCIUM 40 MG/1
40 TABLET, FILM COATED ORAL
Qty: 30 | Refills: 5 | Status: DISCONTINUED | COMMUNITY
Start: 2018-11-21 | End: 2022-03-07

## 2022-05-06 PROBLEM — I10 HIGH BLOOD PRESSURE: Status: RESOLVED | Noted: 2022-05-06 | Resolved: 2022-05-06

## 2022-05-12 ENCOUNTER — OUTPATIENT (OUTPATIENT)
Dept: OUTPATIENT SERVICES | Facility: HOSPITAL | Age: 57
LOS: 1 days | End: 2022-05-12
Payer: COMMERCIAL

## 2022-05-12 ENCOUNTER — NON-APPOINTMENT (OUTPATIENT)
Age: 57
End: 2022-05-12

## 2022-05-12 ENCOUNTER — RESULT REVIEW (OUTPATIENT)
Age: 57
End: 2022-05-12

## 2022-05-12 ENCOUNTER — APPOINTMENT (OUTPATIENT)
Dept: NEUROSURGERY | Facility: CLINIC | Age: 57
End: 2022-05-12
Payer: COMMERCIAL

## 2022-05-12 VITALS
OXYGEN SATURATION: 98 % | HEART RATE: 80 BPM | HEIGHT: 63 IN | WEIGHT: 220 LBS | SYSTOLIC BLOOD PRESSURE: 137 MMHG | BODY MASS INDEX: 38.98 KG/M2 | DIASTOLIC BLOOD PRESSURE: 88 MMHG | RESPIRATION RATE: 18 BRPM

## 2022-05-12 DIAGNOSIS — I10 ESSENTIAL (PRIMARY) HYPERTENSION: ICD-10-CM

## 2022-05-12 PROCEDURE — 99204 OFFICE O/P NEW MOD 45 MIN: CPT

## 2022-05-12 PROCEDURE — 72110 X-RAY EXAM L-2 SPINE 4/>VWS: CPT

## 2022-05-12 PROCEDURE — 72110 X-RAY EXAM L-2 SPINE 4/>VWS: CPT | Mod: 26

## 2022-05-12 RX ORDER — DICLOFENAC SODIUM 10 MG/G
1 GEL TOPICAL DAILY
Qty: 1 | Refills: 3 | Status: COMPLETED | COMMUNITY
Start: 2019-11-26 | End: 2022-05-12

## 2022-05-12 NOTE — ASSESSMENT
[FreeTextEntry1] : PLAN\par \par Reviewed MRI and plain films\par Would recommend to get CT L spine\par Continue current medical regime\par Would consider TLIF L 4-5\par Would like for her to consult Dr Samayoa\par \par \par \par \par I, Dr. Khan, personally performed the evaluation and management (E/M) services for this new patient. That E/M includes conducting the initial examination, assessing all conditions, and establishing the plan of care. Today, my ACP, Jyoti Forrest, was here to observe my evaluation and management services for this patient to be followed going forward.\par \par

## 2022-05-12 NOTE — PHYSICAL EXAM
[General Appearance - Alert] : alert [Oriented To Time, Place, And Person] : oriented to person, place, and time [Motor Tone] : muscle tone was normal in all four extremities [Motor Strength] : muscle strength was normal in all four extremities [Abnormal Walk] : normal gait

## 2022-05-12 NOTE — HISTORY OF PRESENT ILLNESS
[de-identified] : 57 yo right handed male PMH Htn HDL with c/o low back pain for many years but recently has developed increase in low back pain radiating down left leg  and numbness and pain left leg\par Denies any urinary or incontinence of stool \par What aggravates the pain is standing for a prolong period of time and what at times relieves it is laying down\par Takes tylenol for relief\par She has undergone VICTORIA with minnimal relief   Last VICTORIA 11/2021\par \par

## 2022-06-22 DIAGNOSIS — M41.9 SCOLIOSIS, UNSPECIFIED: ICD-10-CM

## 2022-06-23 ENCOUNTER — NON-APPOINTMENT (OUTPATIENT)
Age: 57
End: 2022-06-23

## 2022-06-23 ENCOUNTER — APPOINTMENT (OUTPATIENT)
Dept: SPINE | Facility: CLINIC | Age: 57
End: 2022-06-23
Payer: COMMERCIAL

## 2022-06-23 VITALS
SYSTOLIC BLOOD PRESSURE: 130 MMHG | OXYGEN SATURATION: 98 % | HEIGHT: 63 IN | DIASTOLIC BLOOD PRESSURE: 89 MMHG | BODY MASS INDEX: 38.98 KG/M2 | HEART RATE: 87 BPM | WEIGHT: 220 LBS | RESPIRATION RATE: 16 BRPM | TEMPERATURE: 97.8 F

## 2022-06-23 DIAGNOSIS — M41.9 SCOLIOSIS, UNSPECIFIED: ICD-10-CM

## 2022-06-23 PROCEDURE — 99215 OFFICE O/P EST HI 40 MIN: CPT

## 2022-06-23 NOTE — HISTORY OF PRESENT ILLNESS
[de-identified] : The patient is a 57-year-old woman who has previously seen one of my partners who recommended a possible surgical intervention.  She was not aware that she had scoliosis until last year and says that most of her symptoms are all residing in the left leg and otherwise she has minimal back pain

## 2022-06-23 NOTE — DISCUSSION/SUMMARY
[de-identified] : I think that we need to further quantify her scoliosis using an EOS x-ray so that we can fully realize exactly what abnormal curvature is occurring I think that her symptoms in the left leg are a dynamic process and I do not recommend a short segment single level surgery additionally I recommend aerobic exercise 4 to benefits 1 is the benefit of the exercise itself and the second would be at least a 10 to 20% body weight loss I which would dramatically improve her symptoms and should she need to proceed to surgery I would be much easier to to handle.  I we had a long discussion about the natural history of scoliosis and lumbar radiculopathy and answered all her questions to the best my ability.\par \par Alfonzo Samayoa M.D., M.Sc.\par \par Department of Neurosurgery\par [facility]\par [facility]\par [facility]\par [place], [place]\par [email or web address]\par [phone]

## 2022-07-20 ENCOUNTER — LABORATORY RESULT (OUTPATIENT)
Age: 57
End: 2022-07-20

## 2022-07-20 ENCOUNTER — NON-APPOINTMENT (OUTPATIENT)
Age: 57
End: 2022-07-20

## 2022-07-20 ENCOUNTER — APPOINTMENT (OUTPATIENT)
Dept: HEART AND VASCULAR | Facility: CLINIC | Age: 57
End: 2022-07-20

## 2022-07-20 VITALS
HEIGHT: 63 IN | DIASTOLIC BLOOD PRESSURE: 94 MMHG | SYSTOLIC BLOOD PRESSURE: 140 MMHG | RESPIRATION RATE: 14 BRPM | HEART RATE: 75 BPM | BODY MASS INDEX: 38.62 KG/M2 | WEIGHT: 218 LBS

## 2022-07-20 PROCEDURE — 99214 OFFICE O/P EST MOD 30 MIN: CPT | Mod: 25

## 2022-07-20 PROCEDURE — 36415 COLL VENOUS BLD VENIPUNCTURE: CPT

## 2022-07-20 PROCEDURE — 93000 ELECTROCARDIOGRAM COMPLETE: CPT

## 2022-07-20 NOTE — REVIEW OF SYSTEMS
Assessment:  This is an 78 y.o.female who presents for follow-up of a 3 day hospital admission at [facility] from February 9 through the 12th for influenza A, a small area of right upper lobe pneumonia, and a brief episode of atrial fibrillation which had resolved by the time she was admitted to the hospital.      1. Pneumonia of right upper lobe due to influenza A virus     2. Paroxysmal atrial fibrillation           Plan:   At this point she can continue with symptomatic care.  We do not have a record of any of her immunizations.  She said she did get a flu shot last fall.  I would like to confirm that she has had both pneumonia shots and her daughter said that she would request immunization records from her previous clinic.  If she does not continue to gradually improve, or if she worsens significantly, starts to run a fever or becomes short of breath again, etc., she should follow up for reevaluation.        Subjective:  This is an 78 y.o.female who presents accompanied by her daughter for follow-up of a 3 day hospital admission earlier this week.  Her symptoms started 10 days ago with fever, cough, shortness of breath, and fatigue.  A week ago she presented to the [facility] where they diagnosed her with influenza A and a small pneumonia infiltrate in the right mid field on chest x-ray.  She also had new onset atrial fibrillation with a ventricular rate in the 160s.  They had some difficulty finding her a hospital bed and eventually admitted her to [facility] where she remained for 3 days.  I have the admission summary but no discharge summary yet.  By the time she arrived at [facility] the atrial fibrillation had resolved.  While hospitalized she had an echocardiogram showing an ejection fraction of 55-60%, stage I diastolic dysfunction, mild atrial stenosis, and no other significant findings.  A CT angiography ruled out pulmonary embolus.  She says she was discharged 4 days ago and is  "feeling significantly better though not completely back to baseline.  She says she did receive a flu shot last fall but is unsure about her pneumonia immunization status.  She says she believes she received another pneumonia shot 23 years ago but does not know if she received a previous Pneumovax.  I have personally reviewed the available records from the [facility] and [facility].      Objective:      Visit Vitals     /60 (Patient Site: Left Arm, Patient Position: Sitting, Cuff Size: Adult Large)     Pulse 60     Temp 97.8  F (36.6  C) (Oral)     Resp 22     Ht 5' 0.75\" (1.543 m)     Wt 160 lb (72.6 kg)     SpO2 92%     Breastfeeding No     BMI 30.48 kg/m2     History   Smoking Status     Current Every Day Smoker   Smokeless Tobacco     Never Used     Comment: 4 to 5 cigarettes a day.  Quit for 28 years during pregnancies, then resumed.       Physical Exam:   She is alert in no respiratory distress.  Vital signs recorded above, oxygen saturation 92% on room air.  Note that on hospital admission she required supplemental oxygen to maintain saturations.  Lungs have rare scattered rhonchi but good airflow and no wheezing.  Heart has a regular rate and rhythm with a faint 1/6 systolic ejection murmur, no S3 or S4 or ectopy.  She is in sinus rhythm.  Abdomen is soft and nontender.  Extremities without edema.          Current Outpatient Prescriptions on File Prior to Visit   Medication Sig Dispense Refill     aspirin 81 MG EC tablet Take 81 mg by mouth daily.       ATORVASTATIN CALCIUM (LIPITOR ORAL) Take by mouth.       brimonidine-timolol (COMBIGAN) 0.2-0.5 % ophthalmic solution 1 drop 2 (two) times a day.       GLIPIZIDE ORAL Take by mouth.       latanoprost (XALATAN) 0.005 % ophthalmic solution 1 drop bedtime.       lisinopril (PRINIVIL,ZESTRIL) 10 MG tablet Take 10 mg by mouth daily.       metFORMIN (GLUCOPHAGE) 500 MG tablet Take 500 mg by mouth 2 (two) times a day with meals.       TIMOLOL OPHT Apply " to eye.       No current facility-administered medications on file prior to visit.        Medications were reconciled today.      Penny Wiggins M.D.      This note has been dictated using voice recognition software.  Any grammatical, typographical, or context distortions are unintentional and inherent to the software.   [Negative] : Heme/Lymph [FreeTextEntry5] : See HPI

## 2022-07-20 NOTE — HISTORY OF PRESENT ILLNESS
[FreeTextEntry1] : The patient denies anorexia, arthralgias, body aches, cyanosis, diaphoresis, dizziness, fatigue, fever, headaches, insomnia, joint pains, leg edema, loss of appetite, myalgias, night sweats, palpitations, weight gain or loss.\par Denies Chest Pain, SOB, Dizziness, Leg edema, Orthopnea, PND, Palpitations, Syncope, TYSON, Diaphoresis.\par

## 2022-07-20 NOTE — DISCUSSION/SUMMARY
[Hypertension] : hypertension [Increase] : increasing angiotensin receptor blockers [Weight Loss] : weight loss [Low Sodium Diet] : low sodium diet [Patient] : the patient [Family] : the patient's family [FreeTextEntry1] : Blood work drawn. Maintain a low caloric, low sodium, low cholesterol  diet. Dietary counseling given, diet and exercise discussed in detail, weight loss recommended.

## 2022-07-20 NOTE — PHYSICAL EXAM

## 2022-07-21 LAB
25(OH)D3 SERPL-MCNC: 36.7 NG/ML
ALBUMIN SERPL ELPH-MCNC: 4.3 G/DL
ALP BLD-CCNC: 174 U/L
ALT SERPL-CCNC: 29 U/L
ANION GAP SERPL CALC-SCNC: 15 MMOL/L
AST SERPL-CCNC: 23 U/L
BASOPHILS # BLD AUTO: 0.06 K/UL
BASOPHILS NFR BLD AUTO: 1.1 %
BILIRUB SERPL-MCNC: 0.4 MG/DL
BUN SERPL-MCNC: 15 MG/DL
CALCIUM SERPL-MCNC: 9.8 MG/DL
CHLORIDE SERPL-SCNC: 105 MMOL/L
CHOLEST SERPL-MCNC: 223 MG/DL
CO2 SERPL-SCNC: 23 MMOL/L
COVID-19 SPIKE DOMAIN ANTIBODY INTERPRETATION: POSITIVE
CREAT SERPL-MCNC: 0.91 MG/DL
EGFR: 73 ML/MIN/1.73M2
EOSINOPHIL # BLD AUTO: 0.3 K/UL
EOSINOPHIL NFR BLD AUTO: 5.4 %
ESTIMATED AVERAGE GLUCOSE: 126 MG/DL
FOLATE SERPL-MCNC: 17.2 NG/ML
GLUCOSE SERPL-MCNC: 104 MG/DL
HBA1C MFR BLD HPLC: 6 %
HCT VFR BLD CALC: 42.2 %
HDLC SERPL-MCNC: 71 MG/DL
HGB BLD-MCNC: 13.6 G/DL
IMM GRANULOCYTES NFR BLD AUTO: 0.2 %
LDLC SERPL CALC-MCNC: 124 MG/DL
LYMPHOCYTES # BLD AUTO: 2.04 K/UL
LYMPHOCYTES NFR BLD AUTO: 36.8 %
MAN DIFF?: NORMAL
MCHC RBC-ENTMCNC: 29.1 PG
MCHC RBC-ENTMCNC: 32.2 GM/DL
MCV RBC AUTO: 90.2 FL
MEV IGG FLD QL IA: 131 AU/ML
MEV IGG+IGM SER-IMP: POSITIVE
MONOCYTES # BLD AUTO: 0.33 K/UL
MONOCYTES NFR BLD AUTO: 6 %
MUV AB SER-ACNC: POSITIVE
MUV IGG SER QL IA: >300 AU/ML
NEUTROPHILS # BLD AUTO: 2.8 K/UL
NEUTROPHILS NFR BLD AUTO: 50.5 %
NONHDLC SERPL-MCNC: 152 MG/DL
PLATELET # BLD AUTO: 280 K/UL
POTASSIUM SERPL-SCNC: 4.5 MMOL/L
PROT SERPL-MCNC: 6.5 G/DL
RBC # BLD: 4.68 M/UL
RBC # FLD: 13.7 %
RUBV IGG FLD-ACNC: 30.9 INDEX
RUBV IGG SER-IMP: POSITIVE
SARS-COV-2 AB SERPL IA-ACNC: >250 U/ML
SODIUM SERPL-SCNC: 144 MMOL/L
T3 SERPL-MCNC: 81 NG/DL
T3FREE SERPL-MCNC: 2.32 PG/ML
T3RU NFR SERPL: 1 TBI
T4 FREE SERPL-MCNC: 1 NG/DL
T4 SERPL-MCNC: 6.3 UG/DL
TRIGL SERPL-MCNC: 142 MG/DL
TSH SERPL-ACNC: 1.54 UIU/ML
VIT B12 SERPL-MCNC: 936 PG/ML
VZV AB TITR SER: POSITIVE
VZV IGG SER IF-ACNC: 703 INDEX
WBC # FLD AUTO: 5.54 K/UL

## 2022-07-25 LAB
M TB IFN-G BLD-IMP: NEGATIVE
QUANTIFERON TB PLUS MITOGEN MINUS NIL: >10 IU/ML
QUANTIFERON TB PLUS NIL: 0.07 IU/ML
QUANTIFERON TB PLUS TB1 MINUS NIL: 0.03 IU/ML
QUANTIFERON TB PLUS TB2 MINUS NIL: 0.01 IU/ML

## 2022-08-01 LAB
AMPHET UR-MCNC: NEGATIVE
BARBITURATES UR-MCNC: NEGATIVE
BENZODIAZ UR-MCNC: NORMAL
COCAINE METAB.OTHER UR-MCNC: NEGATIVE
CREATININE, URINE: 134.2 MG/DL
METHADONE UR-MCNC: NEGATIVE
METHAQUALONE UR-MCNC: NEGATIVE
OPIATES UR-MCNC: NEGATIVE
PCP UR-MCNC: NEGATIVE
PROPOXYPH UR QL: NEGATIVE
THC UR QL: NEGATIVE

## 2022-08-03 ENCOUNTER — NON-APPOINTMENT (OUTPATIENT)
Age: 57
End: 2022-08-03

## 2022-09-02 RX ORDER — FUROSEMIDE 20 MG/1
20 TABLET ORAL
Refills: 0 | Status: DISCONTINUED | COMMUNITY
End: 2022-09-02

## 2022-09-07 ENCOUNTER — NON-APPOINTMENT (OUTPATIENT)
Age: 57
End: 2022-09-07

## 2022-09-07 ENCOUNTER — APPOINTMENT (OUTPATIENT)
Dept: HEART AND VASCULAR | Facility: CLINIC | Age: 57
End: 2022-09-07

## 2022-09-07 VITALS
BODY MASS INDEX: 38.62 KG/M2 | HEIGHT: 63 IN | SYSTOLIC BLOOD PRESSURE: 125 MMHG | WEIGHT: 218 LBS | HEART RATE: 69 BPM | TEMPERATURE: 96.3 F | DIASTOLIC BLOOD PRESSURE: 81 MMHG

## 2022-09-07 PROCEDURE — 93000 ELECTROCARDIOGRAM COMPLETE: CPT

## 2022-09-07 PROCEDURE — ZZZZZ: CPT

## 2022-09-07 RX ORDER — METOPROLOL SUCCINATE 25 MG/1
25 TABLET, EXTENDED RELEASE ORAL
Qty: 90 | Refills: 1 | Status: DISCONTINUED | COMMUNITY
Start: 2022-08-31 | End: 2022-09-07

## 2022-09-07 RX ORDER — ROSUVASTATIN CALCIUM 40 MG/1
40 TABLET, FILM COATED ORAL DAILY
Qty: 90 | Refills: 3 | Status: DISCONTINUED | COMMUNITY
Start: 2022-03-07 | End: 2022-09-07

## 2022-09-07 NOTE — PHYSICAL EXAM
[Well Developed] : well developed [Well Nourished] : well nourished [No Acute Distress] : no acute distress [5th Left ICS - MCL] : palpated at the 5th LICS in the midclavicular line [Normal Rate] : normal [Rhythm Regular] : regular [Normal S1] : normal S1 [Normal S2] : normal S2 [Clear Lung Fields] : clear lung fields [Good Air Entry] : good air entry [No Respiratory Distress] : no respiratory distress  [Soft] : abdomen soft [Normal Gait] : normal gait [No Edema] : no edema [No Rash] : no rash [Moves all extremities] : moves all extremities [No Focal Deficits] : no focal deficits [Alert and Oriented] : alert and oriented [Normal memory] : normal memory [Cognitive Impairment] : cognitive impairment

## 2022-09-07 NOTE — ADDENDUM
[FreeTextEntry1] : I, Pancho Horowitz, hereby attest that the medical record entry for this patient accurately reflects signatures/notations that I made on the Date of Service in my capacity as an Attending Physician when I treated/diagnosed the above patient. I do hereby attest that this information is true, accurate and complete to the best of my knowledge.  I was present for the entire visit and supervised the entire visit including assessing the patient, conducting exam and establishing the plan of care.  I personally performed the evaluation and management services and agree with the plan as outlined above.\par \par

## 2022-09-07 NOTE — HISTORY OF PRESENT ILLNESS
[FreeTextEntry1] : 57 year old female with HTN and atrial flutter, who presents for initial evaluation.\par \par She states about 5 weeks ago she woke up at night secondary to palpitations.  She went to [facility] ER and was cardioverted and discharged on Toprol and Eliquis.  Review of EKG shows typical atrial flutter with a ventricular rate of 145bpm.  She states about a week later the same thing happened and she went back to their ER and was in atrial flutter and her Metoprolol was increased and she was discharged.  She believes she self converted about 8 hours later.  No clear precipitating factors.  She states she does not snore.  TSH was WNL recently\par \par No further episodes.  Some lightheadedness with palpations.  No chest pain, SOB, syncope, near syncope, orthopnea, PND.  She is compliant with ELiquis.\par \par

## 2022-09-07 NOTE — DISCUSSION/SUMMARY
[FreeTextEntry1] : 57 year old female with HTN and atrial flutter, who presents for initial evaluation.  We discussed the normal conduction system of the heart and what atrial flutter is.  We discussed the association with stroke and she is on Eliquis (CHADS score at least 2).  She has now had two episodes that she is very symptomatic from.  She denies snoring and TSH WNL.  We discussed treatment options including observation, antiarrhythmic medications or an ablation.  EKG looks like typical atrial flutter and she would like to proceed with an ablation.  We discussed the procedure in detail including risks, benefits and alternatives.  Risks including, but not limited to; infection, anesthesia reaction, bleeding, pain, vascular injury, cardiac perforation, esophageal injury/fistula,  TE/CVA, phrenic nerve injury, arrhythmia recurrence, need for emergent surgery and death were discussed. \par \par Ms. Freeman  appeared to understand the whole discussion and verbalized that all his questions were answered to his satisfaction.  She was given pre procedure instructions and knows to call with any questions or concerns.  \par \par During this visit we reviewed outside medical records including event monitor, echo, MRI, office notes and care plan was discussed with patient / family as well as referring doctor.\par  [EKG obtained to assist in diagnosis and management of assessed problem(s)] : EKG obtained to assist in diagnosis and management of assessed problem(s)

## 2022-09-07 NOTE — REVIEW OF SYSTEMS
[Fever] : no fever [Chills] : no chills [Feeling Fatigued] : not feeling fatigued [SOB] : no shortness of breath [Dyspnea on exertion] : not dyspnea during exertion [Chest Discomfort] : no chest discomfort [Palpitations] : palpitations [Syncope] : no syncope [Negative] : Heme/Lymph

## 2022-09-07 NOTE — CARDIOLOGY SUMMARY
[de-identified] : 9/7/2022 SInus at 66bpm RSV [de-identified] : 3/2022 - EF 55-60% mod MR, mild-mod TR, mild-mod LAE

## 2022-09-12 LAB — SARS-COV-2 N GENE NPH QL NAA+PROBE: NOT DETECTED

## 2022-09-13 ENCOUNTER — OUTPATIENT (OUTPATIENT)
Dept: OUTPATIENT SERVICES | Facility: HOSPITAL | Age: 57
LOS: 1 days | Discharge: HOME | End: 2022-09-13
Payer: COMMERCIAL

## 2022-09-13 VITALS — WEIGHT: 212.97 LBS | HEIGHT: 62 IN

## 2022-09-13 VITALS — HEIGHT: 62 IN | WEIGHT: 212.97 LBS

## 2022-09-13 DIAGNOSIS — Z98.890 OTHER SPECIFIED POSTPROCEDURAL STATES: Chronic | ICD-10-CM

## 2022-09-13 LAB
ANION GAP SERPL CALC-SCNC: 9 MMOL/L — SIGNIFICANT CHANGE UP (ref 5–17)
APTT BLD: 33.5 SEC — SIGNIFICANT CHANGE UP (ref 27.5–35.5)
BLD GP AB SCN SERPL QL: NEGATIVE — SIGNIFICANT CHANGE UP
BUN SERPL-MCNC: 10 MG/DL — SIGNIFICANT CHANGE UP (ref 7–23)
CALCIUM SERPL-MCNC: 9.4 MG/DL — SIGNIFICANT CHANGE UP (ref 8.4–10.5)
CHLORIDE SERPL-SCNC: 107 MMOL/L — SIGNIFICANT CHANGE UP (ref 96–108)
CO2 SERPL-SCNC: 26 MMOL/L — SIGNIFICANT CHANGE UP (ref 22–31)
CREAT SERPL-MCNC: 0.87 MG/DL — SIGNIFICANT CHANGE UP (ref 0.5–1.3)
EGFR: 77 ML/MIN/1.73M2 — SIGNIFICANT CHANGE UP
GLUCOSE SERPL-MCNC: 96 MG/DL — SIGNIFICANT CHANGE UP (ref 70–99)
HCT VFR BLD CALC: 40.6 % — SIGNIFICANT CHANGE UP (ref 34.5–45)
HGB BLD-MCNC: 13.4 G/DL — SIGNIFICANT CHANGE UP (ref 11.5–15.5)
INR BLD: 1.09 — SIGNIFICANT CHANGE UP (ref 0.88–1.16)
ISTAT INR: 1.2 — HIGH (ref 0.88–1.16)
ISTAT PT: 14.4 SEC — HIGH (ref 10–12.9)
ISTAT VENOUS BE: 4 MMOL/L — HIGH (ref -2–3)
ISTAT VENOUS GLUCOSE: 102 MG/DL — HIGH (ref 70–99)
ISTAT VENOUS HCO3: 27 MMOL/L — SIGNIFICANT CHANGE UP (ref 23–28)
ISTAT VENOUS HEMATOCRIT: 38 % — SIGNIFICANT CHANGE UP (ref 34.5–45)
ISTAT VENOUS HEMOGLOBIN: 12.9 GM/DL — SIGNIFICANT CHANGE UP (ref 11.5–15.5)
ISTAT VENOUS IONIZED CALCIUM: 1.16 MMOL/L — SIGNIFICANT CHANGE UP (ref 1.12–1.3)
ISTAT VENOUS PCO2: 36 MMHG — LOW (ref 41–51)
ISTAT VENOUS PH: 7.48 — HIGH (ref 7.31–7.41)
ISTAT VENOUS PO2: <66 MMHG — SIGNIFICANT CHANGE UP (ref 35–40)
ISTAT VENOUS POTASSIUM: 4.5 MMOL/L — SIGNIFICANT CHANGE UP (ref 3.5–5.3)
ISTAT VENOUS SO2: 77 % — SIGNIFICANT CHANGE UP
ISTAT VENOUS SODIUM: 140 MMOL/L — SIGNIFICANT CHANGE UP (ref 135–145)
ISTAT VENOUS TCO2: 28 MMOL/L — SIGNIFICANT CHANGE UP (ref 22–31)
MCHC RBC-ENTMCNC: 29.1 PG — SIGNIFICANT CHANGE UP (ref 27–34)
MCHC RBC-ENTMCNC: 33 GM/DL — SIGNIFICANT CHANGE UP (ref 32–36)
MCV RBC AUTO: 88.3 FL — SIGNIFICANT CHANGE UP (ref 80–100)
NRBC # BLD: 0 /100 WBCS — SIGNIFICANT CHANGE UP (ref 0–0)
PLATELET # BLD AUTO: 160 K/UL — SIGNIFICANT CHANGE UP (ref 150–400)
POCT ISTAT CREATININE: 0.9 MG/DL — SIGNIFICANT CHANGE UP (ref 0.5–1.3)
POTASSIUM SERPL-MCNC: 4.1 MMOL/L — SIGNIFICANT CHANGE UP (ref 3.5–5.3)
POTASSIUM SERPL-SCNC: 4.1 MMOL/L — SIGNIFICANT CHANGE UP (ref 3.5–5.3)
PROTHROM AB SERPL-ACNC: 13 SEC — SIGNIFICANT CHANGE UP (ref 10.5–13.4)
RBC # BLD: 4.6 M/UL — SIGNIFICANT CHANGE UP (ref 3.8–5.2)
RBC # FLD: 13.8 % — SIGNIFICANT CHANGE UP (ref 10.3–14.5)
RH IG SCN BLD-IMP: POSITIVE — SIGNIFICANT CHANGE UP
SODIUM SERPL-SCNC: 142 MMOL/L — SIGNIFICANT CHANGE UP (ref 135–145)
WBC # BLD: 6.17 K/UL — SIGNIFICANT CHANGE UP (ref 3.8–10.5)
WBC # FLD AUTO: 6.17 K/UL — SIGNIFICANT CHANGE UP (ref 3.8–10.5)

## 2022-09-13 PROCEDURE — C1730: CPT

## 2022-09-13 PROCEDURE — 86900 BLOOD TYPING SEROLOGIC ABO: CPT

## 2022-09-13 PROCEDURE — 86850 RBC ANTIBODY SCREEN: CPT

## 2022-09-13 PROCEDURE — 84132 ASSAY OF SERUM POTASSIUM: CPT

## 2022-09-13 PROCEDURE — 93653 COMPRE EP EVAL TX SVT: CPT

## 2022-09-13 PROCEDURE — 85027 COMPLETE CBC AUTOMATED: CPT

## 2022-09-13 PROCEDURE — C1766: CPT

## 2022-09-13 PROCEDURE — 85014 HEMATOCRIT: CPT

## 2022-09-13 PROCEDURE — 85610 PROTHROMBIN TIME: CPT

## 2022-09-13 PROCEDURE — 82565 ASSAY OF CREATININE: CPT

## 2022-09-13 PROCEDURE — ZZZZZ: CPT

## 2022-09-13 PROCEDURE — 85730 THROMBOPLASTIN TIME PARTIAL: CPT

## 2022-09-13 PROCEDURE — C1894: CPT

## 2022-09-13 PROCEDURE — C1760: CPT

## 2022-09-13 PROCEDURE — 82803 BLOOD GASES ANY COMBINATION: CPT

## 2022-09-13 PROCEDURE — 86901 BLOOD TYPING SEROLOGIC RH(D): CPT

## 2022-09-13 PROCEDURE — 80048 BASIC METABOLIC PNL TOTAL CA: CPT

## 2022-09-13 PROCEDURE — 82947 ASSAY GLUCOSE BLOOD QUANT: CPT

## 2022-09-13 PROCEDURE — C1732: CPT

## 2022-09-13 PROCEDURE — 84295 ASSAY OF SERUM SODIUM: CPT

## 2022-09-13 PROCEDURE — 82330 ASSAY OF CALCIUM: CPT

## 2022-09-13 RX ORDER — METOPROLOL TARTRATE 50 MG
1 TABLET ORAL
Qty: 0 | Refills: 0 | DISCHARGE

## 2022-09-13 RX ORDER — LANSOPRAZOLE 15 MG/1
1 CAPSULE, DELAYED RELEASE ORAL
Qty: 0 | Refills: 0 | DISCHARGE

## 2022-09-13 RX ORDER — FOLIC ACID 0.8 MG
1 TABLET ORAL
Qty: 0 | Refills: 0 | DISCHARGE

## 2022-09-13 RX ORDER — APIXABAN 2.5 MG/1
1 TABLET, FILM COATED ORAL
Qty: 0 | Refills: 0 | DISCHARGE

## 2022-09-13 RX ORDER — VALSARTAN 80 MG/1
1 TABLET ORAL
Qty: 0 | Refills: 0 | DISCHARGE

## 2022-09-13 NOTE — PRE-ANESTHESIA EVALUATION ADULT - NSANTHOSAYNRD_GEN_A_CORE
No. REGINA screening performed.  STOP BANG Legend: 0-2 = LOW Risk; 3-4 = INTERMEDIATE Risk; 5-8 = HIGH Risk

## 2022-09-13 NOTE — H&P ADULT - ASSESSMENT
57 year old female with HTN and atrial flutter, who presents for initial scheduled EPS and an ablation.

## 2022-09-13 NOTE — H&P ADULT - HISTORY OF PRESENT ILLNESS
57 year old female with HTN and atrial flutter, who presents for initial scheduled EPS and an ablation.   She states about 5 weeks ago she woke up at night secondary to palpitations.  She went to [facility] ER and was cardioverted and discharged on Toprol and Eliquis.  Review of EKG shows typical atrial flutter with a ventricular rate of 145bpm.  She states about a week later the same thing happened and she went back to their ER and was in atrial flutter and her Metoprolol was increased and she was discharged.  She believes she self converted about 8 hours later.  No clear precipitating factors.  She states she does not snore.  TSH was WNL recently  No further episodes.  Some lightheadedness with palpations.  No chest pain, SOB, syncope, near syncope, orthopnea, PND.  She is compliant with ELiquis.

## 2022-10-10 PROBLEM — I48.92 UNSPECIFIED ATRIAL FLUTTER: Chronic | Status: ACTIVE | Noted: 2022-09-13

## 2022-10-10 PROBLEM — I10 ESSENTIAL (PRIMARY) HYPERTENSION: Chronic | Status: ACTIVE | Noted: 2022-09-13

## 2022-10-27 ENCOUNTER — APPOINTMENT (OUTPATIENT)
Dept: NEUROSURGERY | Facility: CLINIC | Age: 57
End: 2022-10-27

## 2022-11-07 ENCOUNTER — OUTPATIENT (OUTPATIENT)
Dept: OUTPATIENT SERVICES | Facility: HOSPITAL | Age: 57
LOS: 1 days | End: 2022-11-07

## 2022-11-07 ENCOUNTER — NON-APPOINTMENT (OUTPATIENT)
Age: 57
End: 2022-11-07

## 2022-11-07 ENCOUNTER — APPOINTMENT (OUTPATIENT)
Dept: MRI IMAGING | Facility: CLINIC | Age: 57
End: 2022-11-07

## 2022-11-07 ENCOUNTER — APPOINTMENT (OUTPATIENT)
Dept: HEART AND VASCULAR | Facility: CLINIC | Age: 57
End: 2022-11-07

## 2022-11-07 ENCOUNTER — RESULT REVIEW (OUTPATIENT)
Age: 57
End: 2022-11-07

## 2022-11-07 VITALS
HEIGHT: 63 IN | RESPIRATION RATE: 16 BRPM | SYSTOLIC BLOOD PRESSURE: 230 MMHG | HEART RATE: 69 BPM | DIASTOLIC BLOOD PRESSURE: 90 MMHG | BODY MASS INDEX: 38.27 KG/M2 | WEIGHT: 216 LBS

## 2022-11-07 DIAGNOSIS — J30.2 OTHER SEASONAL ALLERGIC RHINITIS: ICD-10-CM

## 2022-11-07 DIAGNOSIS — Z98.890 OTHER SPECIFIED POSTPROCEDURAL STATES: Chronic | ICD-10-CM

## 2022-11-07 PROCEDURE — 72148 MRI LUMBAR SPINE W/O DYE: CPT | Mod: 26

## 2022-11-07 PROCEDURE — 93000 ELECTROCARDIOGRAM COMPLETE: CPT

## 2022-11-07 PROCEDURE — 99214 OFFICE O/P EST MOD 30 MIN: CPT | Mod: 25

## 2022-11-07 NOTE — PHYSICAL EXAM

## 2022-11-07 NOTE — DISCUSSION/SUMMARY
[Hypertension] : hypertension [Begin] : beginning thiazide diuretics [Increase] : increasing angiotensin receptor blockers [Low Sodium Diet] : low sodium diet [NSAIDs Avoidance] : non-steroidal anti-inflammatory drugs avoidance [Mitral Regurgitation] : mitral regurgitation [Stable] : stable [Sodium Restriction] : sodium restriction [Patient] : the patient [Paroxysmal Atrial Fibrillation] : paroxysmal atrial fibrillation [None] : There are no changes in medication management [Medication Changes Per Orders] : Medication changes are as documented in orders [de-identified] : in SR. [de-identified] : rheumatic mitral stenosis [FreeTextEntry1] : Blood work reviewed with patient. Maintain a low caloric, low sodium, low cholesterol diet. Dietary counseling given, diet and exercise discussed in detail.\par

## 2022-11-07 NOTE — HISTORY OF PRESENT ILLNESS
[FreeTextEntry1] : Denies Chest Pain, SOB, Dizziness, Leg edema, Orthopnea, PND, Palpitations, Syncope, TYSON, Diaphoresis.\par

## 2022-11-07 NOTE — ADDENDUM
[FreeTextEntry1] : Documented by Nydia Benoit acting as a scribe for Dr. Kulwinder Hallman on 11/07/2022

## 2022-11-07 NOTE — END OF VISIT
[FreeTextEntry3] : All medical record entries made by the Scribe were at my, Dr. Kulwinder Hallman, direction and personally dictated by me on 11/07/2022. I have reviewed the chart and agree that the record accurately reflects my personal performance of the history, physical exam, assessment and plan. I have also personally directed, reviewed, and agreed with the chart. [Time Spent: ___ minutes] : I have spent [unfilled] minutes of time on the encounter.

## 2022-12-05 NOTE — DISCUSSION/SUMMARY
[de-identified] : I think that we need to further quantify her scoliosis using an EOS x-ray so that we can fully realize exactly what abnormal curvature is occurring I think that her symptoms in the left leg are a dynamic process and I do not recommend a short segment single level surgery additionally I recommend aerobic exercise 4 to benefits 1 is the benefit of the exercise itself and the second would be at least a 10 to 20% body weight loss I which would dramatically improve her symptoms and should she need to proceed to surgery I would be much easier to to handle.  I we had a long discussion about the natural history of scoliosis and lumbar radiculopathy and answered all her questions to the best my ability.\par \par Alfonzo Samayoa M.D., M.Sc.\par \par Department of Neurosurgery\par [facility] [employer] [facility]\par [facility]\par [facility]\par [place], [place]\par [email or web address]\par [phone]

## 2022-12-05 NOTE — HISTORY OF PRESENT ILLNESS
[de-identified] : The patient is a 57-year-old woman who has previously seen one of my partners who recommended a possible surgical intervention.  She was not aware that she had scoliosis until last year and says that most of her symptoms are all residing in the left leg and otherwise she has minimal back pain

## 2022-12-07 PROBLEM — M54.16 LUMBAR RADICULOPATHY: Status: ACTIVE | Noted: 2022-05-06

## 2022-12-08 ENCOUNTER — NON-APPOINTMENT (OUTPATIENT)
Age: 57
End: 2022-12-08

## 2022-12-08 ENCOUNTER — APPOINTMENT (OUTPATIENT)
Dept: NEUROSURGERY | Facility: CLINIC | Age: 57
End: 2022-12-08

## 2022-12-08 VITALS
RESPIRATION RATE: 17 BRPM | TEMPERATURE: 98 F | WEIGHT: 216 LBS | SYSTOLIC BLOOD PRESSURE: 95 MMHG | HEIGHT: 60 IN | DIASTOLIC BLOOD PRESSURE: 66 MMHG | OXYGEN SATURATION: 98 % | BODY MASS INDEX: 42.41 KG/M2 | HEART RATE: 96 BPM

## 2022-12-08 DIAGNOSIS — M54.16 RADICULOPATHY, LUMBAR REGION: ICD-10-CM

## 2022-12-08 PROCEDURE — 99213 OFFICE O/P EST LOW 20 MIN: CPT

## 2022-12-11 NOTE — HISTORY OF PRESENT ILLNESS
[de-identified] : The patient is a 57-year-old woman who has previously seen by Dr Samayoa as well who recommended to loose some weight obtain scoliosis films  Pt returns to clinic to review recent images and asses surgical intervention.  She was not aware that she had scoliosis until last year and says that most of her symptoms are all residing in the left leg and otherwise she has minimal back pain\par Pt returns to follow up with Dr Khan 12/8/2022\par Symptoms persists has lost about 10 pound of weight  \par Reviewed images that is unchanged\par \par Referr to Dr Willsno in January 2023\par \par Current Medications\par Aleeve prn\par Tylenol prn\par Tramadol only if the above doesn’t alleviate her pain\par

## 2022-12-11 NOTE — ASSESSMENT
[FreeTextEntry1] : Impression\par Lumbar radiculopathy\par \par Assessed pt\par Reviewed the images\par Continue to loss weight\par Continue PT for core strengthening\par Refer to Dr Willson in January for second opinion and to review her images\par Pt verbalized her understanding and will that follow up appt in January with Dr Willson\par \par PLAN

## 2023-01-26 ENCOUNTER — APPOINTMENT (OUTPATIENT)
Dept: HEART AND VASCULAR | Facility: CLINIC | Age: 58
End: 2023-01-26
Payer: COMMERCIAL

## 2023-01-26 VITALS
DIASTOLIC BLOOD PRESSURE: 80 MMHG | TEMPERATURE: 98.2 F | HEIGHT: 62 IN | RESPIRATION RATE: 17 BRPM | WEIGHT: 215 LBS | SYSTOLIC BLOOD PRESSURE: 125 MMHG | BODY MASS INDEX: 39.56 KG/M2

## 2023-01-26 DIAGNOSIS — J06.9 ACUTE UPPER RESPIRATORY INFECTION, UNSPECIFIED: ICD-10-CM

## 2023-01-26 PROCEDURE — 99213 OFFICE O/P EST LOW 20 MIN: CPT

## 2023-01-26 NOTE — DISCUSSION/SUMMARY
[FreeTextEntry1] : URI - Abx as above. Tylenol/Bedrest/fluids. F/u if no resolution. \par Blood work drawn. Maintain a low caloric, low sodium, low cholesterol diet. Dietary counseling given, diet and exercise discussed in detail.

## 2023-01-26 NOTE — PHYSICAL EXAM

## 2023-01-26 NOTE — PHYSICAL EXAM

## 2023-01-26 NOTE — END OF VISIT
[Time Spent: ___ minutes] : I have spent [unfilled] minutes of time on the encounter. [FreeTextEntry3] : All medical record entries made by the Scribe were at my, Dr. Kulwinder Hallman, direction and personally dictated by me on 01/26/2023. I have reviewed the chart and agree that the record accurately reflects my personal performance of the history, physical exam, assessment and plan. I have also personally directed, reviewed, and agreed with the chart.

## 2023-01-26 NOTE — HISTORY OF PRESENT ILLNESS
[FreeTextEntry1] : The patient presents with nasal congestion, rhinorrhea (clear), sore throat (scratchy, worse with cough), cough (copious sputum, no hemoptysis), c/o headache, c/o fever (subjective) starting 3 days prior to the visit. \par Denies Chest Pain, SOB, Dizziness, Leg edema, Orthopnea, PND, Palpitations, Syncope, TYSON, Diaphoresis.

## 2023-01-26 NOTE — REVIEW OF SYSTEMS
[Negative] : Heme/Lymph [FreeTextEntry4] : See HPI [FreeTextEntry5] : See HPI  [FreeTextEntry6] : See HPI

## 2023-01-28 LAB
RAPID RVP RESULT: NOT DETECTED
SARS-COV-2 RNA PNL RESP NAA+PROBE: NOT DETECTED

## 2023-02-02 LAB
ANION GAP SERPL CALC-SCNC: 14 MMOL/L
BUN SERPL-MCNC: 14 MG/DL
CALCIUM SERPL-MCNC: 10 MG/DL
CHLORIDE SERPL-SCNC: 101 MMOL/L
CO2 SERPL-SCNC: 25 MMOL/L
CREAT SERPL-MCNC: 0.96 MG/DL
EGFR: 68 ML/MIN/1.73M2
GLUCOSE SERPL-MCNC: 114 MG/DL
POTASSIUM SERPL-SCNC: 4.3 MMOL/L
SODIUM SERPL-SCNC: 140 MMOL/L

## 2023-02-13 ENCOUNTER — RX RENEWAL (OUTPATIENT)
Age: 58
End: 2023-02-13

## 2023-03-03 ENCOUNTER — NON-APPOINTMENT (OUTPATIENT)
Age: 58
End: 2023-03-03

## 2023-03-03 ENCOUNTER — APPOINTMENT (OUTPATIENT)
Dept: HEART AND VASCULAR | Facility: CLINIC | Age: 58
End: 2023-03-03
Payer: COMMERCIAL

## 2023-03-03 VITALS
DIASTOLIC BLOOD PRESSURE: 82 MMHG | HEIGHT: 62 IN | BODY MASS INDEX: 39.75 KG/M2 | WEIGHT: 216 LBS | SYSTOLIC BLOOD PRESSURE: 145 MMHG | HEART RATE: 71 BPM | RESPIRATION RATE: 17 BRPM

## 2023-03-03 DIAGNOSIS — J30.9 ALLERGIC RHINITIS, UNSPECIFIED: ICD-10-CM

## 2023-03-03 PROCEDURE — 93000 ELECTROCARDIOGRAM COMPLETE: CPT

## 2023-03-03 PROCEDURE — 99214 OFFICE O/P EST MOD 30 MIN: CPT | Mod: 25

## 2023-03-03 RX ORDER — MONTELUKAST 10 MG/1
10 TABLET, FILM COATED ORAL DAILY
Qty: 90 | Refills: 3 | Status: ACTIVE | COMMUNITY
Start: 1900-01-01 | End: 1900-01-01

## 2023-03-03 RX ORDER — AZITHROMYCIN 250 MG/1
250 TABLET, FILM COATED ORAL
Qty: 1 | Refills: 1 | Status: DISCONTINUED | COMMUNITY
Start: 2023-01-26 | End: 2023-03-03

## 2023-03-03 NOTE — PHYSICAL EXAM
[Well Developed] : well developed [Well Nourished] : well nourished [No Acute Distress] : no acute distress [Normal Conjunctiva] : normal conjunctiva [Normal Venous Pressure] : normal venous pressure [No Carotid Bruit] : no carotid bruit [Normal S1, S2] : normal S1, S2 [No Murmur] : no murmur [No Rub] : no rub [No Gallop] : no gallop [Clear Lung Fields] : clear lung fields [Good Air Entry] : good air entry [No Respiratory Distress] : no respiratory distress  [Soft] : abdomen soft [Non Tender] : non-tender [No Masses/organomegaly] : no masses/organomegaly [Normal Bowel Sounds] : normal bowel sounds [Normal Gait] : normal gait [No Edema] : no edema [No Cyanosis] : no cyanosis [No Clubbing] : no clubbing [No Varicosities] : no varicosities [No Rash] : no rash [No Skin Lesions] : no skin lesions [Moves all extremities] : moves all extremities [No Focal Deficits] : no focal deficits [Normal Speech] : normal speech [Alert and Oriented] : alert and oriented [Normal memory] : normal memory [de-identified] : mild nasal congestion no LN mild frontal sinus  pressure

## 2023-03-03 NOTE — ADDENDUM
[FreeTextEntry1] : I, Sal Conde, assisted in documentation on 03/03/2023 acting as a scribe for Dr. Angel Kauffman.\par \par

## 2023-03-03 NOTE — DISCUSSION/SUMMARY
[Paroxysmal Atrial Fibrillation] : paroxysmal atrial fibrillation [Stable] : stable [ECG] : an ECG [Continue] : continuing anticoagulants [EKG obtained to assist in diagnosis and management of assessed problem(s)] : EKG obtained to assist in diagnosis and management of assessed problem(s)

## 2023-03-03 NOTE — HISTORY OF PRESENT ILLNESS
[FreeTextEntry1] : 03/03/23\par nasal congestion w progressive wheeze and sob \par No fever or cough n\par Had used inhaler in past > 5 yrs ago \par denies allergies

## 2023-03-21 ENCOUNTER — RX RENEWAL (OUTPATIENT)
Age: 58
End: 2023-03-21

## 2023-04-20 RX ORDER — LANSOPRAZOLE 30 MG/1
30 CAPSULE, DELAYED RELEASE ORAL DAILY
Qty: 90 | Refills: 3 | Status: ACTIVE | COMMUNITY
Start: 2019-09-18 | End: 1900-01-01

## 2023-04-20 RX ORDER — FLUOROMETHOLONE 1 MG/ML
0.1 SOLUTION/ DROPS OPHTHALMIC 3 TIMES DAILY
Qty: 1 | Refills: 3 | Status: ACTIVE | COMMUNITY
Start: 2023-04-20 | End: 1900-01-01

## 2023-05-31 ENCOUNTER — LABORATORY RESULT (OUTPATIENT)
Age: 58
End: 2023-05-31

## 2023-05-31 ENCOUNTER — APPOINTMENT (OUTPATIENT)
Dept: HEART AND VASCULAR | Facility: CLINIC | Age: 58
End: 2023-05-31
Payer: COMMERCIAL

## 2023-05-31 ENCOUNTER — NON-APPOINTMENT (OUTPATIENT)
Age: 58
End: 2023-05-31

## 2023-05-31 VITALS
HEIGHT: 62 IN | BODY MASS INDEX: 39.75 KG/M2 | DIASTOLIC BLOOD PRESSURE: 90 MMHG | WEIGHT: 216 LBS | HEART RATE: 59 BPM | SYSTOLIC BLOOD PRESSURE: 140 MMHG | RESPIRATION RATE: 16 BRPM

## 2023-05-31 DIAGNOSIS — M25.551 PAIN IN RIGHT HIP: ICD-10-CM

## 2023-05-31 DIAGNOSIS — U07.1 COVID-19: ICD-10-CM

## 2023-05-31 DIAGNOSIS — G89.29 PAIN IN RIGHT HIP: ICD-10-CM

## 2023-05-31 PROCEDURE — ZZZZZ: CPT

## 2023-05-31 PROCEDURE — 93000 ELECTROCARDIOGRAM COMPLETE: CPT

## 2023-05-31 PROCEDURE — 36415 COLL VENOUS BLD VENIPUNCTURE: CPT

## 2023-05-31 PROCEDURE — 93306 TTE W/DOPPLER COMPLETE: CPT

## 2023-05-31 PROCEDURE — 99214 OFFICE O/P EST MOD 30 MIN: CPT | Mod: 25

## 2023-05-31 RX ORDER — DIPHENHYDRAMINE HCL 50 MG/1
50 CAPSULE ORAL
Qty: 30 | Refills: 3 | Status: DISCONTINUED | COMMUNITY
Start: 2022-11-07 | End: 2023-05-31

## 2023-05-31 RX ORDER — PAROXETINE HYDROCHLORIDE 10 MG/1
10 TABLET, FILM COATED ORAL DAILY
Qty: 30 | Refills: 3 | Status: DISCONTINUED | COMMUNITY
Start: 2022-07-20 | End: 2023-05-31

## 2023-05-31 RX ORDER — METHYLPREDNISOLONE 4 MG/1
4 TABLET ORAL
Qty: 1 | Refills: 3 | Status: DISCONTINUED | COMMUNITY
Start: 2023-05-03 | End: 2023-05-31

## 2023-05-31 RX ORDER — FLUTICASONE PROPIONATE 50 UG/1
50 SPRAY, METERED NASAL DAILY
Qty: 1 | Refills: 3 | Status: DISCONTINUED | COMMUNITY
Start: 2023-03-03 | End: 2023-05-31

## 2023-05-31 RX ORDER — LEVOCETIRIZINE DIHYDROCHLORIDE 5 MG/1
5 TABLET ORAL
Qty: 30 | Refills: 3 | Status: DISCONTINUED | COMMUNITY
Start: 2023-03-03 | End: 2023-05-31

## 2023-05-31 NOTE — PHYSICAL EXAM
[Well Developed] : well developed [Well Nourished] : well nourished [No Acute Distress] : no acute distress [Normal Conjunctiva] : normal conjunctiva [Normal Venous Pressure] : normal venous pressure [No Carotid Bruit] : no carotid bruit [Normal S1, S2] : normal S1, S2 [No Rub] : no rub [No Gallop] : no gallop [Murmur] : murmur [Clear Lung Fields] : clear lung fields [Good Air Entry] : good air entry [No Respiratory Distress] : no respiratory distress  [Soft] : abdomen soft [Non Tender] : non-tender [No Masses/organomegaly] : no masses/organomegaly [Normal Bowel Sounds] : normal bowel sounds [No Edema] : no edema [No Cyanosis] : no cyanosis [No Clubbing] : no clubbing [No Varicosities] : no varicosities [No Rash] : no rash [No Skin Lesions] : no skin lesions [Moves all extremities] : moves all extremities [No Focal Deficits] : no focal deficits [Normal Speech] : normal speech [Alert and Oriented] : alert and oriented [Normal memory] : normal memory [de-identified] : PERRLA, EOMI, clear conjunctiva, (+)pharyngeal erythema, no tonsillar exudate, no JVD, nonicteric [de-identified] : 3/6 MARK --> Axilla; (+) mitral "click"; 2/4 diastolic murmur [de-identified] : R hip pain with decreased ROM-> pt. limping at present.

## 2023-05-31 NOTE — END OF VISIT
[FreeTextEntry3] : All medical record entries made by the Scribe were at my, Dr. Kulwinder Hallman, direction and personally dictated by me on 05/31/2023. I have reviewed the chart and agree that the record accurately reflects my personal performance of the history, physical exam, assessment and plan. I have also personally directed, reviewed, and agreed with the chart. [Time Spent: ___ minutes] : I have spent [unfilled] minutes of time on the encounter. Hemigard Intro: Due to skin fragility and wound tension, it was decided to use HEMIGARD adhesive retention suture devices to permit a linear closure. The skin was cleaned and dried for a 6cm distance away from the wound. Excessive hair, if present, was removed to allow for adhesion.

## 2023-05-31 NOTE — HISTORY OF PRESENT ILLNESS
[FreeTextEntry1] : Denies Chest Pain, SOB, Dizziness, Leg edema, Orthopnea, PND, Palpitations, Syncope, TYSON, Diaphoresis.

## 2023-05-31 NOTE — DISCUSSION/SUMMARY
[Atrial Fibrillation] : atrial fibrillation [Controlled Ventricular Response] : controlled ventricular response [Hyperlipidemia] : hyperlipidemia [Diet Modification] : diet modification [Hypertension] : hypertension [Stable] : stable [None] : There are no changes in medication management [Low Sodium Diet] : low sodium diet [NSAIDs Avoidance] : non-steroidal anti-inflammatory drugs avoidance [Patient] : the patient [FreeTextEntry1] : Mitral stenosis with insufficiency - Stable; no further intervention at this time.\par Blood work drawn. Maintain a low caloric, low sodium, low cholesterol diet. Dietary counseling given, diet and exercise discussed in detail.\par Referral to Ortho for chronic (worsening) of R hip pain- in interim, gabapentin increased and tramadol begun for pain relief.

## 2023-06-01 LAB
25(OH)D3 SERPL-MCNC: 29.8 NG/ML
ALBUMIN SERPL ELPH-MCNC: 4.3 G/DL
ALP BLD-CCNC: 130 U/L
ALT SERPL-CCNC: 30 U/L
ANION GAP SERPL CALC-SCNC: 11 MMOL/L
AST SERPL-CCNC: 27 U/L
BILIRUB SERPL-MCNC: 0.4 MG/DL
BUN SERPL-MCNC: 10 MG/DL
CALCIUM SERPL-MCNC: 10 MG/DL
CHLORIDE SERPL-SCNC: 101 MMOL/L
CHOLEST SERPL-MCNC: 206 MG/DL
CO2 SERPL-SCNC: 29 MMOL/L
CREAT SERPL-MCNC: 1.01 MG/DL
EGFR: 64 ML/MIN/1.73M2
ESTIMATED AVERAGE GLUCOSE: 131 MG/DL
FOLATE SERPL-MCNC: 7.6 NG/ML
GLUCOSE SERPL-MCNC: 101 MG/DL
HBA1C MFR BLD HPLC: 6.2 %
HDLC SERPL-MCNC: 68 MG/DL
LDLC SERPL CALC-MCNC: 105 MG/DL
MEV IGG FLD QL IA: 123 AU/ML
MEV IGG+IGM SER-IMP: POSITIVE
MUV AB SER-ACNC: POSITIVE
MUV IGG SER QL IA: >300 AU/ML
NONHDLC SERPL-MCNC: 138 MG/DL
POTASSIUM SERPL-SCNC: 4.3 MMOL/L
PROT SERPL-MCNC: 6.1 G/DL
RUBV IGG FLD-ACNC: >33 INDEX
RUBV IGG SER-IMP: POSITIVE
SODIUM SERPL-SCNC: 140 MMOL/L
T3 SERPL-MCNC: 107 NG/DL
T3FREE SERPL-MCNC: 2.72 PG/ML
T3RU NFR SERPL: 1.1 TBI
T4 FREE SERPL-MCNC: 1.2 NG/DL
T4 SERPL-MCNC: 8.2 UG/DL
TRIGL SERPL-MCNC: 166 MG/DL
TSH SERPL-ACNC: 1.93 UIU/ML
VIT B12 SERPL-MCNC: 518 PG/ML
VZV AB TITR SER: POSITIVE
VZV IGG SER IF-ACNC: 560.6 INDEX

## 2023-06-05 LAB
AMPHET UR-MCNC: NEGATIVE NG/ML
BARBITURATES UR-MCNC: NEGATIVE NG/ML
BENZODIAZ UR-MCNC: NEGATIVE NG/ML
COCAINE METAB.OTHER UR-MCNC: NEGATIVE NG/ML
CREATININE, URINE: 52.3 MG/DL
FENTANYL, URINE: NEGATIVE NG/ML
M TB IFN-G BLD-IMP: NEGATIVE
METHADONE UR-MCNC: NEGATIVE NG/ML
OPIATES UR-MCNC: NEGATIVE NG/ML
OXYCODONE/OXYMORPHONE, URINE: NEGATIVE NG/ML
PCP UR-MCNC: NEGATIVE NG/ML
PH, URINE: 5.7
PLEASE NOTE: DRUGSCRUR: NORMAL
QUANTIFERON TB PLUS MITOGEN MINUS NIL: >10 IU/ML
QUANTIFERON TB PLUS NIL: 0.02 IU/ML
QUANTIFERON TB PLUS TB1 MINUS NIL: 0.04 IU/ML
QUANTIFERON TB PLUS TB2 MINUS NIL: 0.06 IU/ML
THC UR QL: NEGATIVE NG/ML

## 2023-06-22 ENCOUNTER — APPOINTMENT (OUTPATIENT)
Dept: HEART AND VASCULAR | Facility: CLINIC | Age: 58
End: 2023-06-22

## 2023-07-10 ENCOUNTER — APPOINTMENT (OUTPATIENT)
Dept: HEART AND VASCULAR | Facility: CLINIC | Age: 58
End: 2023-07-10
Payer: COMMERCIAL

## 2023-07-10 ENCOUNTER — NON-APPOINTMENT (OUTPATIENT)
Age: 58
End: 2023-07-10

## 2023-07-10 VITALS
SYSTOLIC BLOOD PRESSURE: 150 MMHG | DIASTOLIC BLOOD PRESSURE: 90 MMHG | HEART RATE: 82 BPM | HEIGHT: 62 IN | BODY MASS INDEX: 38.46 KG/M2 | RESPIRATION RATE: 14 BRPM | WEIGHT: 209 LBS

## 2023-07-10 DIAGNOSIS — Z01.810 ENCOUNTER FOR PREPROCEDURAL CARDIOVASCULAR EXAMINATION: ICD-10-CM

## 2023-07-10 PROCEDURE — 93000 ELECTROCARDIOGRAM COMPLETE: CPT | Mod: NC

## 2023-07-10 PROCEDURE — 36415 COLL VENOUS BLD VENIPUNCTURE: CPT

## 2023-07-10 PROCEDURE — 99214 OFFICE O/P EST MOD 30 MIN: CPT | Mod: 25

## 2023-07-11 LAB
ALBUMIN SERPL ELPH-MCNC: 4.7 G/DL
ALP BLD-CCNC: 166 U/L
ALT SERPL-CCNC: 30 U/L
ANION GAP SERPL CALC-SCNC: 14 MMOL/L
APPEARANCE: CLEAR
APTT BLD: 47 SEC
AST SERPL-CCNC: 27 U/L
BACTERIA: NEGATIVE /HPF
BILIRUB SERPL-MCNC: 0.6 MG/DL
BILIRUBIN URINE: NEGATIVE
BLOOD URINE: NEGATIVE
BUN SERPL-MCNC: 9 MG/DL
CALCIUM SERPL-MCNC: 10.8 MG/DL
CAST: 0 /LPF
CHLORIDE SERPL-SCNC: 103 MMOL/L
CO2 SERPL-SCNC: 25 MMOL/L
COLOR: YELLOW
CREAT SERPL-MCNC: 0.83 MG/DL
CRP SERPL-MCNC: <3 MG/L
EGFR: 81 ML/MIN/1.73M2
EPITHELIAL CELLS: 0 /HPF
ERYTHROCYTE [SEDIMENTATION RATE] IN BLOOD BY WESTERGREN METHOD: 27 MM/HR
ESTIMATED AVERAGE GLUCOSE: 131 MG/DL
GLUCOSE QUALITATIVE U: NEGATIVE MG/DL
GLUCOSE SERPL-MCNC: 106 MG/DL
HBA1C MFR BLD HPLC: 6.2 %
INR PPP: 1.18 RATIO
KETONES URINE: NEGATIVE MG/DL
LEUKOCYTE ESTERASE URINE: NEGATIVE
MICROSCOPIC-UA: NORMAL
NITRITE URINE: NEGATIVE
PH URINE: 7.5
POTASSIUM SERPL-SCNC: 4.2 MMOL/L
PROT SERPL-MCNC: 6.8 G/DL
PROTEIN URINE: NEGATIVE MG/DL
PT BLD: 13.7 SEC
RED BLOOD CELLS URINE: 1 /HPF
SODIUM SERPL-SCNC: 142 MMOL/L
SPECIFIC GRAVITY URINE: 1.01
UROBILINOGEN URINE: 0.2 MG/DL
WHITE BLOOD CELLS URINE: 0 /HPF

## 2023-07-11 NOTE — DISCUSSION/SUMMARY
[Procedure Intermediate Risk] : the procedure risk is intermediate [Patient Intermediate Risk] : the patient is an intermediate risk [As per surgery] : as per surgery [Continue] : Continue medications as currently directed [Optimized for Surgery] : the patient is optimized for surgery [FreeTextEntry1] : Pt, appears optimized and is cleared for the planned surgical procedure.

## 2023-07-11 NOTE — HISTORY OF PRESENT ILLNESS
[Preoperative Visit] : for a medical evaluation prior to surgery [Scheduled Procedure ___] : a [unfilled] [Date of Surgery ___] : on [unfilled] [Surgeon Name ___] : surgeon: [unfilled] [Good] : Good [Prior Anesthesia] : Prior anesthesia [Fever] : no fever [Chills] : no chills [Fatigue] : no fatigue [Chest Pain] : no chest pain [Cough] : no cough [Dyspnea] : no dyspnea [Dysuria] : no dysuria [Urinary Frequency] : no urinary frequency [Nausea] : no nausea [Vomiting] : no vomiting [Diarrhea] : no diarrhea [Abdominal Pain] : no abdominal pain [Easy Bruising] : no easy bruising [Lower Extremity Swelling] : no lower extremity swelling [Poor Exercise Tolerance] : no poor exercise tolerance [Diabetes] : no diabetes [Cardiovascular Disease] : no cardiovascular disease [Anti-Platelet Agents] : no anti-platelet agents [Pulmonary Disease] : no pulmonary disease [Nicotine Dependence] : no nicotine dependence [Renal Disease] : no renal disease [GI Disease] : no gastrointestinal disease [Sleep Apnea] : no sleep apnea [Thromboembolic Problems] : no thromboembolic problems [Clotting Disorder] : no clotting disorder [Bleeding Disorder] : no bleeding disorder [Transfusion Reaction] : no transfusion reaction [Prev Anesthesia Reaction] : no previous anesthesia reaction [Impaired Immunity] : no impaired immunity

## 2023-07-12 LAB — BACTERIA UR CULT: NORMAL

## 2023-08-30 RX ORDER — APIXABAN 5 MG/1
5 TABLET, FILM COATED ORAL
Qty: 180 | Refills: 1 | Status: ACTIVE | COMMUNITY
Start: 2022-08-11 | End: 1900-01-01

## 2023-10-10 ENCOUNTER — RX RENEWAL (OUTPATIENT)
Age: 58
End: 2023-10-10

## 2023-10-16 ENCOUNTER — NON-APPOINTMENT (OUTPATIENT)
Age: 58
End: 2023-10-16

## 2023-10-16 ENCOUNTER — LABORATORY RESULT (OUTPATIENT)
Age: 58
End: 2023-10-16

## 2023-10-16 ENCOUNTER — APPOINTMENT (OUTPATIENT)
Dept: HEART AND VASCULAR | Facility: CLINIC | Age: 58
End: 2023-10-16
Payer: COMMERCIAL

## 2023-10-16 VITALS
BODY MASS INDEX: 38.09 KG/M2 | HEIGHT: 62 IN | DIASTOLIC BLOOD PRESSURE: 80 MMHG | SYSTOLIC BLOOD PRESSURE: 125 MMHG | WEIGHT: 207 LBS | RESPIRATION RATE: 14 BRPM | HEART RATE: 80 BPM

## 2023-10-16 DIAGNOSIS — Z02.89 ENCOUNTER FOR OTHER ADMINISTRATIVE EXAMINATIONS: ICD-10-CM

## 2023-10-16 DIAGNOSIS — Z23 ENCOUNTER FOR IMMUNIZATION: ICD-10-CM

## 2023-10-16 PROCEDURE — G0008: CPT

## 2023-10-16 PROCEDURE — 36415 COLL VENOUS BLD VENIPUNCTURE: CPT

## 2023-10-16 PROCEDURE — 93000 ELECTROCARDIOGRAM COMPLETE: CPT

## 2023-10-16 PROCEDURE — 99214 OFFICE O/P EST MOD 30 MIN: CPT | Mod: 25

## 2023-10-16 PROCEDURE — 90682 RIV4 VACC RECOMBINANT DNA IM: CPT

## 2023-10-16 RX ORDER — ALPRAZOLAM 0.5 MG/1
0.5 TABLET ORAL TWICE DAILY
Qty: 60 | Refills: 0 | Status: ACTIVE | COMMUNITY
Start: 2018-08-28 | End: 1900-01-01

## 2023-10-17 LAB
25(OH)D3 SERPL-MCNC: 26.8 NG/ML
ALBUMIN SERPL ELPH-MCNC: 4.4 G/DL
ALP BLD-CCNC: 133 U/L
ALT SERPL-CCNC: 18 U/L
ANION GAP SERPL CALC-SCNC: 14 MMOL/L
AST SERPL-CCNC: 22 U/L
BASOPHILS # BLD AUTO: 0.06 K/UL
BASOPHILS NFR BLD AUTO: 0.8 %
BILIRUB SERPL-MCNC: 0.4 MG/DL
BUN SERPL-MCNC: 15 MG/DL
CALCIUM SERPL-MCNC: 10 MG/DL
CHLORIDE SERPL-SCNC: 101 MMOL/L
CHOLEST SERPL-MCNC: 288 MG/DL
CO2 SERPL-SCNC: 25 MMOL/L
CREAT SERPL-MCNC: 1.17 MG/DL
EGFR: 53 ML/MIN/1.73M2
EOSINOPHIL # BLD AUTO: 0.26 K/UL
EOSINOPHIL NFR BLD AUTO: 3.4 %
ESTIMATED AVERAGE GLUCOSE: 123 MG/DL
FOLATE SERPL-MCNC: 5.4 NG/ML
GLUCOSE SERPL-MCNC: 70 MG/DL
HBA1C MFR BLD HPLC: 5.9 %
HCT VFR BLD CALC: 44.2 %
HDLC SERPL-MCNC: 94 MG/DL
HGB BLD-MCNC: 14 G/DL
IMM GRANULOCYTES NFR BLD AUTO: 0.1 %
LDLC SERPL CALC-MCNC: 166 MG/DL
LYMPHOCYTES # BLD AUTO: 2.06 K/UL
LYMPHOCYTES NFR BLD AUTO: 27.3 %
MAN DIFF?: NORMAL
MCHC RBC-ENTMCNC: 28.4 PG
MCHC RBC-ENTMCNC: 31.7 GM/DL
MCV RBC AUTO: 89.7 FL
MONOCYTES # BLD AUTO: 0.67 K/UL
MONOCYTES NFR BLD AUTO: 8.9 %
NEUTROPHILS # BLD AUTO: 4.49 K/UL
NEUTROPHILS NFR BLD AUTO: 59.5 %
NONHDLC SERPL-MCNC: 194 MG/DL
PLATELET # BLD AUTO: 242 K/UL
POTASSIUM SERPL-SCNC: 3.9 MMOL/L
PROT SERPL-MCNC: 6.5 G/DL
RBC # BLD: 4.93 M/UL
RBC # FLD: 17.6 %
SODIUM SERPL-SCNC: 139 MMOL/L
T3 SERPL-MCNC: 61 NG/DL
T3FREE SERPL-MCNC: 2.4 PG/ML
T3RU NFR SERPL: 1.1 TBI
T4 FREE SERPL-MCNC: 1.3 NG/DL
T4 SERPL-MCNC: 6.8 UG/DL
TRIGL SERPL-MCNC: 162 MG/DL
TSH SERPL-ACNC: 1 UIU/ML
VIT B12 SERPL-MCNC: 441 PG/ML
WBC # FLD AUTO: 7.55 K/UL

## 2023-11-27 ENCOUNTER — RX RENEWAL (OUTPATIENT)
Age: 58
End: 2023-11-27

## 2023-12-07 ENCOUNTER — RX RENEWAL (OUTPATIENT)
Age: 58
End: 2023-12-07

## 2024-02-09 DIAGNOSIS — J20.9 ACUTE BRONCHITIS, UNSPECIFIED: ICD-10-CM

## 2024-03-06 RX ORDER — ALBUTEROL SULFATE 90 UG/1
108 (90 BASE) INHALANT RESPIRATORY (INHALATION)
Qty: 2 | Refills: 3 | Status: ACTIVE | COMMUNITY
Start: 1900-01-01 | End: 1900-01-01

## 2024-03-15 RX ORDER — TRAMADOL HYDROCHLORIDE 50 MG/1
50 TABLET, COATED ORAL
Qty: 60 | Refills: 3 | Status: ACTIVE | COMMUNITY
Start: 2023-05-31 | End: 1900-01-01

## 2024-04-02 ENCOUNTER — RX RENEWAL (OUTPATIENT)
Age: 59
End: 2024-04-02

## 2024-04-02 RX ORDER — HYDROCHLOROTHIAZIDE 12.5 MG/1
12.5 TABLET ORAL
Qty: 30 | Refills: 5 | Status: ACTIVE | COMMUNITY
Start: 2022-11-07 | End: 1900-01-01

## 2024-04-02 RX ORDER — ROSUVASTATIN CALCIUM 10 MG/1
10 TABLET, FILM COATED ORAL
Qty: 90 | Refills: 1 | Status: ACTIVE | COMMUNITY
Start: 2023-02-13 | End: 1900-01-01

## 2024-05-08 ENCOUNTER — LABORATORY RESULT (OUTPATIENT)
Age: 59
End: 2024-05-08

## 2024-05-08 ENCOUNTER — NON-APPOINTMENT (OUTPATIENT)
Age: 59
End: 2024-05-08

## 2024-05-08 ENCOUNTER — APPOINTMENT (OUTPATIENT)
Dept: HEART AND VASCULAR | Facility: CLINIC | Age: 59
End: 2024-05-08
Payer: SELF-PAY

## 2024-05-08 VITALS
DIASTOLIC BLOOD PRESSURE: 80 MMHG | HEART RATE: 67 BPM | HEIGHT: 62 IN | BODY MASS INDEX: 39.01 KG/M2 | WEIGHT: 212 LBS | RESPIRATION RATE: 14 BRPM | SYSTOLIC BLOOD PRESSURE: 120 MMHG

## 2024-05-08 DIAGNOSIS — Z02.89 ENCOUNTER FOR OTHER ADMINISTRATIVE EXAMINATIONS: ICD-10-CM

## 2024-05-08 DIAGNOSIS — Z86.39 PERSONAL HISTORY OF OTHER ENDOCRINE, NUTRITIONAL AND METABOLIC DISEASE: ICD-10-CM

## 2024-05-08 DIAGNOSIS — I05.2 RHEUMATIC MITRAL STENOSIS WITH INSUFFICIENCY: ICD-10-CM

## 2024-05-08 DIAGNOSIS — E55.9 VITAMIN D DEFICIENCY, UNSPECIFIED: ICD-10-CM

## 2024-05-08 DIAGNOSIS — I48.91 UNSPECIFIED ATRIAL FIBRILLATION: ICD-10-CM

## 2024-05-08 DIAGNOSIS — I10 ESSENTIAL (PRIMARY) HYPERTENSION: ICD-10-CM

## 2024-05-08 PROCEDURE — G2211 COMPLEX E/M VISIT ADD ON: CPT

## 2024-05-08 PROCEDURE — 93000 ELECTROCARDIOGRAM COMPLETE: CPT

## 2024-05-08 PROCEDURE — 36415 COLL VENOUS BLD VENIPUNCTURE: CPT

## 2024-05-08 PROCEDURE — 99214 OFFICE O/P EST MOD 30 MIN: CPT

## 2024-05-08 NOTE — PHYSICAL EXAM
[Well Developed] : well developed [Well Nourished] : well nourished [No Acute Distress] : no acute distress [Normal Conjunctiva] : normal conjunctiva [Normal Venous Pressure] : normal venous pressure [Normal S1, S2] : normal S1, S2 [No Rub] : no rub [No Gallop] : no gallop [Murmur] : murmur [Clear Lung Fields] : clear lung fields [Good Air Entry] : good air entry [No Respiratory Distress] : no respiratory distress  [Soft] : abdomen soft [Non Tender] : non-tender [No Masses/organomegaly] : no masses/organomegaly [Normal Bowel Sounds] : normal bowel sounds [Normal Gait] : normal gait [No Edema] : no edema [No Cyanosis] : no cyanosis [No Clubbing] : no clubbing [No Varicosities] : no varicosities [No Rash] : no rash [No Skin Lesions] : no skin lesions [Moves all extremities] : moves all extremities [No Focal Deficits] : no focal deficits [Normal Speech] : normal speech [Alert and Oriented] : alert and oriented [Normal memory] : normal memory [de-identified] :  2/6 MARK --> Axilla

## 2024-05-08 NOTE — DISCUSSION/SUMMARY
[Hyperlipidemia] : hyperlipidemia [Diet Modification] : diet modification [Hypertension] : hypertension [Stable] : stable [None] : There are no changes in medication management [Low Sodium Diet] : low sodium diet [NSAIDs Avoidance] : non-steroidal anti-inflammatory drugs avoidance [Patient] : the patient [Paroxysmal Atrial Fibrillation] : paroxysmal atrial fibrillation [de-identified] : in SR [FreeTextEntry1] : Rheumatic Mitral Stenosis- Stable; no further intervention at this time (see last echo) Encounter for Physical Exam for employment- Pt cleared for full duty and activities as required for the job.  Vitamin D deficiency- Blood work was reviewed with the patient. Maintain a low caloric, low sodium, low cholesterol diet. Dietary counseling given, diet and exercise discussed in detail.

## 2024-05-08 NOTE — ADDENDUM
[FreeTextEntry1] :  Documented by Scot Dalton acting as a scribe for Dr. Kulwinder Hallman on 05/08/2024 .   All medical record entries made by the Scribe were at my, Dr. Kulwinder Hallman's direction and personally dictated by me on 05/08/2024 . I have reviewed the chart and agree that the record accurately reflects my personal performance of the history, Physical exam, assessment, and plan. I have also personally directed, reviewed, and agree with the discharge instructions.

## 2024-05-09 LAB
25(OH)D3 SERPL-MCNC: 21.2 NG/ML
ALBUMIN SERPL ELPH-MCNC: 4.2 G/DL
ALP BLD-CCNC: 121 U/L
ALT SERPL-CCNC: 17 U/L
ANION GAP SERPL CALC-SCNC: 12 MMOL/L
AST SERPL-CCNC: 17 U/L
BASOPHILS # BLD AUTO: 0.04 K/UL
BASOPHILS NFR BLD AUTO: 0.6 %
BILIRUB SERPL-MCNC: 0.6 MG/DL
BUN SERPL-MCNC: 25 MG/DL
CALCIUM SERPL-MCNC: 9.5 MG/DL
CHLORIDE SERPL-SCNC: 104 MMOL/L
CHOLEST SERPL-MCNC: 243 MG/DL
CO2 SERPL-SCNC: 23 MMOL/L
CREAT SERPL-MCNC: 1 MG/DL
EGFR: 64 ML/MIN/1.73M2
EOSINOPHIL # BLD AUTO: 0.3 K/UL
EOSINOPHIL NFR BLD AUTO: 4.2 %
ESTIMATED AVERAGE GLUCOSE: 120 MG/DL
FOLATE SERPL-MCNC: 5.2 NG/ML
GLUCOSE SERPL-MCNC: 99 MG/DL
HBA1C MFR BLD HPLC: 5.8 %
HCT VFR BLD CALC: 41.9 %
HDLC SERPL-MCNC: 75 MG/DL
HGB BLD-MCNC: 13.1 G/DL
IMM GRANULOCYTES NFR BLD AUTO: 0.3 %
LDLC SERPL CALC-MCNC: 144 MG/DL
LYMPHOCYTES # BLD AUTO: 1.96 K/UL
LYMPHOCYTES NFR BLD AUTO: 27.6 %
MAN DIFF?: NORMAL
MCHC RBC-ENTMCNC: 29.4 PG
MCHC RBC-ENTMCNC: 31.3 GM/DL
MCV RBC AUTO: 93.9 FL
MEV IGG FLD QL IA: 143 AU/ML
MEV IGG+IGM SER-IMP: POSITIVE
MONOCYTES # BLD AUTO: 0.63 K/UL
MONOCYTES NFR BLD AUTO: 8.9 %
MUV AB SER-ACNC: POSITIVE
MUV IGG SER QL IA: >300 AU/ML
NEUTROPHILS # BLD AUTO: 4.15 K/UL
NEUTROPHILS NFR BLD AUTO: 58.4 %
NONHDLC SERPL-MCNC: 169 MG/DL
PLATELET # BLD AUTO: 258 K/UL
POTASSIUM SERPL-SCNC: 4.3 MMOL/L
PROT SERPL-MCNC: 6.1 G/DL
RBC # BLD: 4.46 M/UL
RBC # FLD: 14.6 %
RUBV IGG FLD-ACNC: 31.3 INDEX
RUBV IGG SER-IMP: POSITIVE
SODIUM SERPL-SCNC: 139 MMOL/L
T3 SERPL-MCNC: 70 NG/DL
T3FREE SERPL-MCNC: 2.14 PG/ML
T3RU NFR SERPL: 1 TBI
T4 FREE SERPL-MCNC: 1.3 NG/DL
T4 SERPL-MCNC: 6.3 UG/DL
TRIGL SERPL-MCNC: 138 MG/DL
TSH SERPL-ACNC: 1.91 UIU/ML
VIT B12 SERPL-MCNC: 344 PG/ML
VZV AB TITR SER: POSITIVE
VZV IGG SER IF-ACNC: 712.8 INDEX
WBC # FLD AUTO: 7.1 K/UL

## 2024-05-13 LAB
M TB IFN-G BLD-IMP: NEGATIVE
QUANTIFERON TB PLUS MITOGEN MINUS NIL: >10 IU/ML
QUANTIFERON TB PLUS NIL: 0.03 IU/ML
QUANTIFERON TB PLUS TB1 MINUS NIL: 0.02 IU/ML
QUANTIFERON TB PLUS TB2 MINUS NIL: 0.01 IU/ML

## 2024-05-15 LAB
AMPHET UR-MCNC: NEGATIVE NG/ML
BARBITURATES UR-MCNC: NEGATIVE NG/ML
BENZODIAZ UR-MCNC: NEGATIVE NG/ML
COCAINE METAB.OTHER UR-MCNC: NEGATIVE NG/ML
CREATININE, URINE: 75.6 MG/DL
FENTANYL, URINE: NEGATIVE NG/ML
METHADONE SCREEN, UR: NEGATIVE NG/ML
OPIATES UR-MCNC: NORMAL
OXYCODONE/OXYMORPHONE, URINE: NEGATIVE NG/ML
PCP UR-MCNC: NEGATIVE NG/ML
PH, URINE: 5.2
THC UR QL: NEGATIVE NG/ML

## 2024-05-28 ENCOUNTER — RX RENEWAL (OUTPATIENT)
Age: 59
End: 2024-05-28

## 2024-05-28 RX ORDER — METOPROLOL SUCCINATE 50 MG/1
50 TABLET, EXTENDED RELEASE ORAL
Qty: 90 | Refills: 1 | Status: ACTIVE | COMMUNITY
Start: 2022-08-11 | End: 1900-01-01

## 2024-05-29 ENCOUNTER — RX RENEWAL (OUTPATIENT)
Age: 59
End: 2024-05-29

## 2024-05-29 RX ORDER — VALSARTAN 320 MG/1
320 TABLET, COATED ORAL
Qty: 90 | Refills: 1 | Status: ACTIVE | COMMUNITY
Start: 2019-10-17 | End: 1900-01-01

## 2024-07-12 DIAGNOSIS — E55.9 VITAMIN D DEFICIENCY, UNSPECIFIED: ICD-10-CM

## 2024-07-12 RX ORDER — VITAMIN K2 90 MCG
1000 CAPSULE ORAL
Qty: 30 | Refills: 3 | Status: ACTIVE | COMMUNITY
Start: 2024-07-12 | End: 1900-01-01

## 2024-09-09 RX ORDER — TIRZEPATIDE 2.5 MG/.5ML
2.5 INJECTION, SOLUTION SUBCUTANEOUS
Qty: 4 | Refills: 3 | Status: ACTIVE | COMMUNITY
Start: 2024-09-05 | End: 1900-01-01

## 2024-09-10 ENCOUNTER — NON-APPOINTMENT (OUTPATIENT)
Age: 59
End: 2024-09-10

## 2024-09-11 RX ORDER — GABAPENTIN 100 MG/1
100 CAPSULE ORAL 3 TIMES DAILY
Qty: 90 | Refills: 5 | Status: ACTIVE | COMMUNITY
Start: 2024-09-11 | End: 1900-01-01

## 2024-10-03 ENCOUNTER — RX RENEWAL (OUTPATIENT)
Age: 59
End: 2024-10-03

## 2024-10-08 DIAGNOSIS — N95.1 MENOPAUSAL AND FEMALE CLIMACTERIC STATES: ICD-10-CM

## 2024-10-08 RX ORDER — FEZOLINETANT 45 MG/1
45 TABLET, FILM COATED ORAL DAILY
Qty: 1 | Refills: 5 | Status: ACTIVE | COMMUNITY
Start: 2024-10-08 | End: 1900-01-01

## 2024-10-10 ENCOUNTER — RX RENEWAL (OUTPATIENT)
Age: 59
End: 2024-10-10

## 2024-11-06 ENCOUNTER — NON-APPOINTMENT (OUTPATIENT)
Age: 59
End: 2024-11-06

## 2024-11-06 ENCOUNTER — APPOINTMENT (OUTPATIENT)
Dept: HEART AND VASCULAR | Facility: CLINIC | Age: 59
End: 2024-11-06
Payer: COMMERCIAL

## 2024-11-06 ENCOUNTER — LABORATORY RESULT (OUTPATIENT)
Age: 59
End: 2024-11-06

## 2024-11-06 VITALS
BODY MASS INDEX: 39.2 KG/M2 | WEIGHT: 213 LBS | HEART RATE: 116 BPM | HEIGHT: 62 IN | SYSTOLIC BLOOD PRESSURE: 120 MMHG | DIASTOLIC BLOOD PRESSURE: 84 MMHG | RESPIRATION RATE: 14 BRPM

## 2024-11-06 DIAGNOSIS — I48.91 UNSPECIFIED ATRIAL FIBRILLATION: ICD-10-CM

## 2024-11-06 DIAGNOSIS — Z86.39 PERSONAL HISTORY OF OTHER ENDOCRINE, NUTRITIONAL AND METABOLIC DISEASE: ICD-10-CM

## 2024-11-06 DIAGNOSIS — I05.2 RHEUMATIC MITRAL STENOSIS WITH INSUFFICIENCY: ICD-10-CM

## 2024-11-06 DIAGNOSIS — I10 ESSENTIAL (PRIMARY) HYPERTENSION: ICD-10-CM

## 2024-11-06 PROCEDURE — 99214 OFFICE O/P EST MOD 30 MIN: CPT

## 2024-11-06 PROCEDURE — 36415 COLL VENOUS BLD VENIPUNCTURE: CPT

## 2024-11-06 PROCEDURE — G2211 COMPLEX E/M VISIT ADD ON: CPT

## 2024-11-06 PROCEDURE — 93000 ELECTROCARDIOGRAM COMPLETE: CPT

## 2024-11-06 RX ORDER — METOPROLOL TARTRATE 50 MG/1
50 TABLET ORAL TWICE DAILY
Qty: 60 | Refills: 4 | Status: ACTIVE | COMMUNITY
Start: 2024-11-06 | End: 1900-01-01

## 2024-11-07 ENCOUNTER — INPATIENT (INPATIENT)
Facility: HOSPITAL | Age: 59
LOS: 0 days | Discharge: HOME | DRG: 310 | End: 2024-11-08
Attending: INTERNAL MEDICINE | Admitting: INTERNAL MEDICINE
Payer: COMMERCIAL

## 2024-11-07 ENCOUNTER — NON-APPOINTMENT (OUTPATIENT)
Age: 59
End: 2024-11-07

## 2024-11-07 ENCOUNTER — RESULT CHARGE (OUTPATIENT)
Age: 59
End: 2024-11-07

## 2024-11-07 ENCOUNTER — RESULT REVIEW (OUTPATIENT)
Age: 59
End: 2024-11-07

## 2024-11-07 VITALS
OXYGEN SATURATION: 96 % | HEART RATE: 75 BPM | RESPIRATION RATE: 16 BRPM | SYSTOLIC BLOOD PRESSURE: 119 MMHG | DIASTOLIC BLOOD PRESSURE: 87 MMHG | TEMPERATURE: 98 F | HEIGHT: 62 IN | WEIGHT: 210.1 LBS

## 2024-11-07 DIAGNOSIS — Z98.890 OTHER SPECIFIED POSTPROCEDURAL STATES: Chronic | ICD-10-CM

## 2024-11-07 DIAGNOSIS — I48.91 UNSPECIFIED ATRIAL FIBRILLATION: ICD-10-CM

## 2024-11-07 DIAGNOSIS — I10 ESSENTIAL (PRIMARY) HYPERTENSION: ICD-10-CM

## 2024-11-07 DIAGNOSIS — I05.0 RHEUMATIC MITRAL STENOSIS: ICD-10-CM

## 2024-11-07 DIAGNOSIS — I48.20 CHRONIC ATRIAL FIBRILLATION, UNSPECIFIED: ICD-10-CM

## 2024-11-07 DIAGNOSIS — E78.5 HYPERLIPIDEMIA, UNSPECIFIED: ICD-10-CM

## 2024-11-07 LAB
ANION GAP SERPL CALC-SCNC: 14 MMOL/L — SIGNIFICANT CHANGE UP (ref 5–17)
APTT BLD: 45 SEC — HIGH (ref 24.5–35.6)
APTT BLD: >200 SEC — CRITICAL HIGH (ref 24.5–35.6)
BASOPHILS # BLD AUTO: 0.01 K/UL — SIGNIFICANT CHANGE UP (ref 0–0.2)
BASOPHILS NFR BLD AUTO: 0.1 % — SIGNIFICANT CHANGE UP (ref 0–2)
BUN SERPL-MCNC: 22 MG/DL — SIGNIFICANT CHANGE UP (ref 7–23)
CALCIUM SERPL-MCNC: 10 MG/DL — SIGNIFICANT CHANGE UP (ref 8.4–10.5)
CHLORIDE SERPL-SCNC: 105 MMOL/L — SIGNIFICANT CHANGE UP (ref 96–108)
CO2 SERPL-SCNC: 23 MMOL/L — SIGNIFICANT CHANGE UP (ref 22–31)
CREAT SERPL-MCNC: 1.15 MG/DL — SIGNIFICANT CHANGE UP (ref 0.5–1.3)
EGFR: 54 ML/MIN/1.73M2 — LOW
EOSINOPHIL # BLD AUTO: 0.01 K/UL — SIGNIFICANT CHANGE UP (ref 0–0.5)
EOSINOPHIL NFR BLD AUTO: 0.1 % — SIGNIFICANT CHANGE UP (ref 0–6)
GLUCOSE SERPL-MCNC: 117 MG/DL — HIGH (ref 70–99)
HCT VFR BLD CALC: 40.3 % — SIGNIFICANT CHANGE UP (ref 34.5–45)
HCT VFR BLD CALC: 42.3 % — SIGNIFICANT CHANGE UP (ref 34.5–45)
HGB BLD-MCNC: 13.2 G/DL — SIGNIFICANT CHANGE UP (ref 11.5–15.5)
HGB BLD-MCNC: 14.2 G/DL — SIGNIFICANT CHANGE UP (ref 11.5–15.5)
IMM GRANULOCYTES NFR BLD AUTO: 0.4 % — SIGNIFICANT CHANGE UP (ref 0–0.9)
INR BLD: 1.13 — SIGNIFICANT CHANGE UP (ref 0.85–1.16)
LYMPHOCYTES # BLD AUTO: 2.2 K/UL — SIGNIFICANT CHANGE UP (ref 1–3.3)
LYMPHOCYTES # BLD AUTO: 26.1 % — SIGNIFICANT CHANGE UP (ref 13–44)
MCHC RBC-ENTMCNC: 28.3 PG — SIGNIFICANT CHANGE UP (ref 27–34)
MCHC RBC-ENTMCNC: 29.4 PG — SIGNIFICANT CHANGE UP (ref 27–34)
MCHC RBC-ENTMCNC: 32.8 G/DL — SIGNIFICANT CHANGE UP (ref 32–36)
MCHC RBC-ENTMCNC: 33.6 G/DL — SIGNIFICANT CHANGE UP (ref 32–36)
MCV RBC AUTO: 86.3 FL — SIGNIFICANT CHANGE UP (ref 80–100)
MCV RBC AUTO: 87.6 FL — SIGNIFICANT CHANGE UP (ref 80–100)
MONOCYTES # BLD AUTO: 0.59 K/UL — SIGNIFICANT CHANGE UP (ref 0–0.9)
MONOCYTES NFR BLD AUTO: 7 % — SIGNIFICANT CHANGE UP (ref 2–14)
NEUTROPHILS # BLD AUTO: 5.6 K/UL — SIGNIFICANT CHANGE UP (ref 1.8–7.4)
NEUTROPHILS NFR BLD AUTO: 66.3 % — SIGNIFICANT CHANGE UP (ref 43–77)
NRBC # BLD: 0 /100 WBCS — SIGNIFICANT CHANGE UP (ref 0–0)
NRBC # BLD: 0 /100 WBCS — SIGNIFICANT CHANGE UP (ref 0–0)
NT-PROBNP SERPL-SCNC: 4131 PG/ML — HIGH (ref 0–300)
PLATELET # BLD AUTO: 258 K/UL — SIGNIFICANT CHANGE UP (ref 150–400)
PLATELET # BLD AUTO: 268 K/UL — SIGNIFICANT CHANGE UP (ref 150–400)
POTASSIUM SERPL-MCNC: 4.1 MMOL/L — SIGNIFICANT CHANGE UP (ref 3.5–5.3)
POTASSIUM SERPL-SCNC: 4.1 MMOL/L — SIGNIFICANT CHANGE UP (ref 3.5–5.3)
PROTHROM AB SERPL-ACNC: 13.2 SEC — SIGNIFICANT CHANGE UP (ref 9.9–13.4)
RBC # BLD: 4.67 M/UL — SIGNIFICANT CHANGE UP (ref 3.8–5.2)
RBC # BLD: 4.83 M/UL — SIGNIFICANT CHANGE UP (ref 3.8–5.2)
RBC # FLD: 13.9 % — SIGNIFICANT CHANGE UP (ref 10.3–14.5)
RBC # FLD: 14.2 % — SIGNIFICANT CHANGE UP (ref 10.3–14.5)
SODIUM SERPL-SCNC: 142 MMOL/L — SIGNIFICANT CHANGE UP (ref 135–145)
TROPONIN T, HIGH SENSITIVITY RESULT: 15 NG/L — SIGNIFICANT CHANGE UP (ref 0–51)
WBC # BLD: 6.5 K/UL — SIGNIFICANT CHANGE UP (ref 3.8–10.5)
WBC # BLD: 8.44 K/UL — SIGNIFICANT CHANGE UP (ref 3.8–10.5)
WBC # FLD AUTO: 6.5 K/UL — SIGNIFICANT CHANGE UP (ref 3.8–10.5)
WBC # FLD AUTO: 8.44 K/UL — SIGNIFICANT CHANGE UP (ref 3.8–10.5)

## 2024-11-07 PROCEDURE — 71045 X-RAY EXAM CHEST 1 VIEW: CPT | Mod: 26

## 2024-11-07 PROCEDURE — 93306 TTE W/DOPPLER COMPLETE: CPT | Mod: 26

## 2024-11-07 PROCEDURE — 93312 ECHO TRANSESOPHAGEAL: CPT | Mod: 26,59

## 2024-11-07 PROCEDURE — 76376 3D RENDER W/INTRP POSTPROCES: CPT | Mod: 26

## 2024-11-07 PROCEDURE — 99285 EMERGENCY DEPT VISIT HI MDM: CPT

## 2024-11-07 PROCEDURE — 92960 CARDIOVERSION ELECTRIC EXT: CPT

## 2024-11-07 PROCEDURE — 99222 1ST HOSP IP/OBS MODERATE 55: CPT | Mod: 25

## 2024-11-07 RX ORDER — METOPROLOL TARTRATE 50 MG
50 TABLET ORAL
Refills: 0 | Status: DISCONTINUED | OUTPATIENT
Start: 2024-11-07 | End: 2024-11-08

## 2024-11-07 RX ORDER — HEPARIN SODIUM 10000 [USP'U]/ML
INJECTION INTRAVENOUS; SUBCUTANEOUS
Qty: 25000 | Refills: 0 | Status: DISCONTINUED | OUTPATIENT
Start: 2024-11-07 | End: 2024-11-08

## 2024-11-07 RX ORDER — ROSUVASTATIN CALCIUM 10 MG
1 TABLET ORAL
Refills: 0 | DISCHARGE

## 2024-11-07 RX ORDER — ROSUVASTATIN CALCIUM 10 MG
10 TABLET ORAL AT BEDTIME
Refills: 0 | Status: DISCONTINUED | OUTPATIENT
Start: 2024-11-07 | End: 2024-11-08

## 2024-11-07 RX ORDER — METOPROLOL TARTRATE 50 MG
1 TABLET ORAL
Refills: 0 | DISCHARGE

## 2024-11-07 RX ORDER — INFLUENZ VIR VAC TV P-SURF2003 15MCG/.5ML
0.5 SYRINGE (ML) INTRAMUSCULAR ONCE
Refills: 0 | Status: DISCONTINUED | OUTPATIENT
Start: 2024-11-07 | End: 2024-11-08

## 2024-11-07 RX ORDER — ROSUVASTATIN CALCIUM 10 MG
40 TABLET ORAL AT BEDTIME
Refills: 0 | Status: DISCONTINUED | OUTPATIENT
Start: 2024-11-07 | End: 2024-11-07

## 2024-11-07 RX ADMIN — HEPARIN SODIUM 1400 UNIT(S)/HR: 10000 INJECTION INTRAVENOUS; SUBCUTANEOUS at 22:22

## 2024-11-07 RX ADMIN — Medication 10 MILLIGRAM(S): at 21:53

## 2024-11-07 RX ADMIN — HEPARIN SODIUM 1700 UNIT(S)/HR: 10000 INJECTION INTRAVENOUS; SUBCUTANEOUS at 10:29

## 2024-11-07 RX ADMIN — HEPARIN SODIUM 1400 UNIT(S)/HR: 10000 INJECTION INTRAVENOUS; SUBCUTANEOUS at 18:54

## 2024-11-07 RX ADMIN — HEPARIN SODIUM 1400 UNIT(S)/HR: 10000 INJECTION INTRAVENOUS; SUBCUTANEOUS at 20:14

## 2024-11-07 RX ADMIN — Medication 50 MILLIGRAM(S): at 17:06

## 2024-11-07 RX ADMIN — HEPARIN SODIUM 0 UNIT(S)/HR: 10000 INJECTION INTRAVENOUS; SUBCUTANEOUS at 17:48

## 2024-11-07 NOTE — H&P ADULT - HISTORY OF PRESENT ILLNESS
59 YOF, +family history of CAD (father) with PMHx of AFIB (on Toprol and Eliquis last dose 11/7), moderate rheumatic mitral stenosis, HTN, HLD presents to [facility] c/o palpitations and SOB x2 days. Reports SOB has been worsening in severity, brought on with minimal exertion and relieved with rest. Also reports increased palpitations  Patient denies chest pain, dizziness, LOC, N/V/D, fever/chills/sick contact, diaphoresis, orthopnea/PND, and leg swelling. Patient was seen at outpatient cards  office yesterday where she was found to be in AFIB rate of 110s and slightly volume overloaded. Patient was changed from Toprol 50mg QD to Lopressor 50mg BID and sent home after HR dropped back to the 80s. This morning patient woke up feeling palpitations and short of breath when her daughter called EP physician Dr. Horowitz where he informed patient to come by the ER for evaluation. In the ED, patient remains in a rate controlled AFIB, stable SBPs and without acute complaints. No medications given in ED. EP and CTSx consulted and are following. Patient is now admitted to cardiac tele for management of atrial fibrillation and work up of mitral stenosis with plans for TTE and SILVERIO and possible DCCV.          TTE  5/2023: mildly dilated LV, with normal systolic function, EF 55-60%, no RWMA, LA mod dilated, mild-mod TR, PASP 32mmHg, mild AR, mod MS.    59 YOF, +family history of CAD (father) with PMHx of AFIB (on Toprol and Eliquis last dose 11/7), questionable CVA in 2022 after diagnosis of AFIB (no neuro deficits), moderate rheumatic mitral stenosis, HTN, HLD presents to [facility] c/o palpitations and SOB x2 days. Reports SOB has been worsening in severity, brought on with minimal exertion and relieved with rest. Also reports increased palpitations  Patient denies chest pain, dizziness, LOC, N/V/D, fever/chills/sick contact, diaphoresis, orthopnea/PND, and leg swelling. Patient was seen at outpatient cards  office yesterday where she was found to be in AFIB rate of 110s and slightly volume overloaded. Patient was changed from Toprol 50mg QD to Lopressor 50mg BID and sent home after HR dropped back to the 80s. This morning patient woke up feeling palpitations and short of breath when her daughter called EP physician Dr. Horowitz where he informed patient to come by the ER for evaluation. In the ED, patient remains in a rate controlled AFIB, stable SBPs and without acute complaints. No medications given in ED. EP and CTSx consulted and are following. Patient is now admitted to cardiac tele for management of atrial fibrillation and work up of mitral stenosis with plans for TTE and SILVERIO and possible DCCV.          TTE  5/2023: mildly dilated LV, with normal systolic function, EF 55-60%, no RWMA, LA mod dilated, mild-mod TR, PASP 32mmHg, mild AR, mod MS.    59 YOF, +family history of CAD (father) with PMHx of AFIB (on Toprol and Eliquis last dose 11/7), questionable CVA in 2022 after diagnosis of AFIB (no neuro deficits), moderate rheumatic mitral stenosis, HTN, HLD presents to [facility] c/o palpitations and SOB x2 days. Reports SOB has been worsening in severity, brought on with minimal exertion and relieved with rest. Also reports increased palpitations  Patient denies chest pain, dizziness, LOC, N/V/D, fever/chills/sick contact, diaphoresis, orthopnea/PND, and leg swelling. Patient was seen at outpatient cards  office yesterday where she was found to be in AFIB rate of 110s and slightly volume overloaded. Patient was changed from Toprol 50mg QD to Lopressor 50mg BID and sent home after HR dropped back to the 80s. This morning patient woke up feeling palpitations and short of breath when her daughter called EP physician Dr. Horowitz where he informed patient to come by the ER for evaluation. In the ED, Labs significant for BNP 4131, Trop T 15. Patient remains in a rate controlled AFIB, stable SBPs and without acute complaints. No medications given in ED. EP and CTSx consulted and are following. Patient is now admitted to cardiac tele for management of atrial fibrillation and work up of mitral stenosis with plans for TTE and SILVERIO and possible DCCV.        TTE  5/2023: mildly dilated LV, with normal systolic function, EF 55-60%, no RWMA, LA mod dilated, mild-mod TR, PASP 32mmHg, mild AR, mod MS.    59 YOF, +family history of CAD (father) with PMHx of AFIB (on Toprol and Eliquis last dose 11/7), questionable CVA in 2022 after diagnosis of AFIB (no neuro deficits), moderate rheumatic mitral stenosis, HTN, HLD presents to [facility] c/o palpitations and SOB x2 days. Reports SOB has been worsening in severity, brought on with minimal exertion and relieved with rest. Also reports increased palpitations  Patient denies chest pain, dizziness, LOC, N/V/D, fever/chills/sick contact, diaphoresis, orthopnea/PND, and leg swelling. Patient was seen at outpatient cards  office yesterday where she was found to be in AFIB rate of 110s and slightly volume overloaded. Patient was changed from Toprol 50mg QD to Lopressor 50mg BID and sent home after HR dropped back to the 80s. This morning patient woke up feeling palpitations and short of breath when her daughter called EP physician Dr. Horowitz where he informed patient to come by the ER for evaluation. In the ED, Labs significant for BNP 4131, Trop T 15. Patient remains in a rate controlled AFIB, stable SBPs and without acute complaints. No medications given in ED. EP and CTSx consulted and are following. Patient is now admitted to cardiac tele for management of atrial fibrillation and work up of mitral stenosis with plans for TTE, SILVERIO and DCCV.        TTE  5/2023: mildly dilated LV, with normal systolic function, EF 55-60%, no RWMA, LA mod dilated, mild-mod TR, PASP 32mmHg, mild AR, mod MS.

## 2024-11-07 NOTE — CONSULT NOTE ADULT - NS ATTEND AMEND GEN_ALL_CORE FT
59F PMH HTN, HLD, aflutter s/p DCCV x1 and ablation (9/2022), asthma, rheumatic mitral stenosis, ovarian cancer (2006, in remission) who presented to ED with complaints of worsening SOB/TYSON and palpitations after being directed to come by her outpatient EP cardiologist Dr. Horowitz for new onset afib. She saw her outpatient general cardiologist Dr. Hallman yesterday 11/6/24 who did EKG in office showing afib, at that time was directed to switch from toprol 50mg daily to lopressor 50mg BID. Plan for SILVERIO/DCCV to rule out LYLY thrombus first and assess MS. will switch Eliquis to coumadin given MS. continue bb. Follow up with structural heart and Dr. Horowitz.

## 2024-11-07 NOTE — H&P ADULT - PROBLEM SELECTOR PLAN 3
SBPs stable   -home meds: HTCZ 12.5mg QD, Valsartan 320mg QD, Lopressor 50mg BID   -was told by outpatient cards to STOP HTCZ and Valsartan (unsure why)   -c/w Lopressor 50mg BID

## 2024-11-07 NOTE — ED ADULT TRIAGE NOTE - CHIEF COMPLAINT QUOTE
Patient presents to ED with palpitation, on eliquis. Patient reports she was seen by cardiologist yest, sent here for cardiac w/u. Endorsing palpitations and sob, Denies headache/dizziness EKG inprog.

## 2024-11-07 NOTE — CONSULT NOTE ADULT - SUBJECTIVE AND OBJECTIVE BOX
HPI: 59F PMH HTN, HLD, aflutter s/p DCCV x1 and ablation (9/2022), asthma, rheumatic mitral stenosis, ovarian cancer (2006, in remission) who presented to ED with complaints of worsening SOB/TYSON and palpitations after being directed to come by her outpatient EP cardiologist Dr. Horowitz for new afib. Patient reports worsening SOB associated with palpitations and chest heaviness that started last friday and has progressively worsened. She saw her outpatient general cardiologist Dr. Hallman yesterday 11/6/24 who did EKG in office showing afib, at that time was directed to switch from toprol 50mg daily to lopressor 50mg BID. Also called EP Dr. Horowitz who directed her to come to ED for cardioversion and further evaluation of her mitral valve for possible intervention given history of moderate mitral stenosis.   Patient seen and examined at bedside along with daughter at bedside. Patient reports symptoms started friday and worsened monday with SOB, palpitations and left sided chest heaviness which worsening with exertion/walking and improve with lying down. Denies any lightheadedness, dizziness or LE edema. Reports she was taking toprol 50mg daily for the past 2 years however yesterday was switched to lopressor 50mg BID by her primary cardiologist; she took 2 doses so far. Reports she takes eliquis however has been taking it once a day not twice a day. Denies any history of recurrent symptoms/aflutter since ablation in 2022 until now. Of note daughter reports patient had MRI head done at [facility] in 2022 which showed signs of stroke.       PHYSICAL EXAM:    General: lying in bed in NAD   Cardiovascular: +S1/S2, irregular rhythm, regular rate  Respiratory: CTA B/L; no W/R/C  Extremities: WWP; no LE edema  Neurological: AAOx3    VITAL SIGNS:  Vital Signs Last 24 Hrs  T(C): 36.6 (07 Nov 2024 07:26), Max: 36.6 (07 Nov 2024 07:26)  T(F): 97.8 (07 Nov 2024 07:26), Max: 97.8 (07 Nov 2024 07:26)  HR: 75 (07 Nov 2024 07:26) (75 - 75)  BP: 119/87 (07 Nov 2024 07:26) (119/87 - 119/87)  BP(mean): --  RR: 16 (07 Nov 2024 07:26) (16 - 16)  SpO2: 96% (07 Nov 2024 07:26) (96% - 96%)    Parameters below as of 07 Nov 2024 07:26  Patient On (Oxygen Delivery Method): room air          MEDICATIONS:  MEDICATIONS  (STANDING):  heparin  Infusion.  Unit(s)/Hr (17 mL/Hr) IV Continuous <Continuous>    MEDICATIONS  (PRN):      ALLERGIES:  Allergies    No Known Allergies    Intolerances        LABS:                        14.2   8.44  )-----------( 268      ( 07 Nov 2024 07:55 )             42.3     11-07    142  |  105  |  22  ----------------------------<  117[H]  4.1   |  23  |  1.15    Ca    10.0      07 Nov 2024 07:55      PT/INR - ( 07 Nov 2024 07:55 )   PT: 13.2 sec;   INR: 1.13          PTT - ( 07 Nov 2024 07:55 )  PTT:45.0 sec  Urinalysis Basic - ( 07 Nov 2024 07:55 )    Color: x / Appearance: x / SG: x / pH: x  Gluc: 117 mg/dL / Ketone: x  / Bili: x / Urobili: x   Blood: x / Protein: x / Nitrite: x   Leuk Esterase: x / RBC: x / WBC x   Sq Epi: x / Non Sq Epi: x / Bacteria: x      CAPILLARY BLOOD GLUCOSE          RADIOLOGY & ADDITIONAL TESTS: Reviewed.

## 2024-11-07 NOTE — ED PROVIDER NOTE - ATTENDING APP SHARED VISIT CONTRIBUTION OF CARE
Pt is a 60yo f, h/o htn, hld, paf on eliquis, who p/w palp and sob x 1 wk. Seen by cards yesterday, told to be in rapid afib. Metoprolol dose increased and started on lasix for volume overload. Referred to ed for admission for further management. Afebrile. HDS. PE as above. + irreg irreg rhythm. Lungs cta b/l. No leg edema. EKG non-ischemic. Pro bnp 4k. Trop 15. Cr wnl. Case d/w Dr. Rizo; will admit to gen efw-suhl.

## 2024-11-07 NOTE — ED ADULT NURSE NOTE - OBJECTIVE STATEMENT
Pt to ER c/o palpitations. Pt was seen by her cardiologist yesterday. Pt was diagnosed of Afib, on eliquis. Pt also c/o chest heaviness but no nausea or vomiting, AAOx4.

## 2024-11-07 NOTE — H&P ADULT - PROBLEM SELECTOR PLAN 4
f/u lipid panel in AM   -c/w Rosuvastatin 10mg     F: None  E: Replete if K<4 or Mag<2  N: DASH Diet  GIppx: none   VTEppx:  heparin gtt   Code: FULL   Dispo: pending CTSx recs

## 2024-11-07 NOTE — H&P ADULT - PROBLEM SELECTOR PLAN 2
--Does not follow with CTSx outpatient   -TTE  5/2023:  EF 55-60%, and moderate MS   --SILVERIO 11/7/24:  normal L + R rosa maria size and systolic function, severely dilated LA, no LA/RA/LYLY/RAA, mitral valve mod thickened, mod MS, means transvalvular gradient is 8.00 mmHg at HR of 86 bpm, mild-mod MR, fibrocalcific tricuspid aortic valve w/o significant stenosis, trace AR, mild PHTN PASP 48mmHg, no effusion   -TTE 11/7/24: normal L and R rosa maria size and systolic function, EF 60-65%, severely dilated LA, mitral leaflets are thickened c/w rheumatic mitral disease, mod MS, mild-mod MR, trace AR, mild-mod TR, no effusion   --due to worsening SOB + recurrent episodes of rapid AFIB CTSx consulted     PLAN   -f/u final CTSx recs-- per Dr. Aldair DE LA GARZA still in surgery until later this evening   -c/w heparin gtt for now-- if no plans for intervention this admission, possibly begin lovenox bridge to transition patient to coumadin per EP

## 2024-11-07 NOTE — H&P ADULT - MUSCULOSKELETAL
Patient: Reena Thurman Date: 2024 8:47 AM   : 1963 Attending: Joana Up MD   60 year old female               Subjective:    Interval History: Patient with acute diverticulitis on conservative therapy.  Patient was doing quite well.  Her pain is improved.  She does not have much of appetite which is not surprising but the diet that she takes she does not have any nausea or vomiting does not have any other complaints.  Patient does have bowel movements    Patient has no new complaints    Scheduled Medications:   Current Facility-Administered Medications   Medication Dose Route Frequency Provider Last Rate Last Admin    pantoprazole (PROTONIX) EC tablet 40 mg  40 mg Oral QAM AC Volstad, Raegan L, NP   40 mg at 24 0608    sodium chloride 0.9 % injection 2 mL  2 mL Intracatheter 2 times per day Tamiko Srinivasan DO   2 mL at 24    Potassium Standard Replacement Protocol (Levels 3.5 and lower)   Does not apply See Admin Instructions Joana Up MD        Potassium Replacement (Levels 3.6 - 4)   Does not apply See Admin Instructions Joana Up MD        Magnesium Standard Replacement Protocol   Does not apply See Admin Instructions Joana Up MD        Phosphorus Standard Replacement Protocol   Does not apply See Admin Instructions Joana Up MD        piperacillin-tazobactam (ZOSYN) 3.375 g in sodium chloride 0.9 % 100 mL IVPB  3.375 g Intravenous 3 times per day Joana Up MD 25 mL/hr at 24 0608 3.375 g at 24 0608    fluticasone-vilanterol (BREO ELLIPTA) 100-25 MCG/ACT inhaler 1 puff  1 puff Inhalation Daily Resp Joana Up MD   1 puff at 24 2205       Continuous Infusions:   Current Facility-Administered Medications   Medication Dose Route Frequency Provider Last Rate Last Admin       As needed Medications:   Current Facility-Administered Medications   Medication Dose Route Frequency Provider Last Rate Last Admin    calcium carbonate (TUMS)  chewable tablet 500 mg  500 mg Oral Q4H PRN Raegan Pantoja NP        ipratropium (ATROVENT HFA) 17 MCG/ACT inhaler 2 puff  2 puff Inhalation Q4H PRN Analia Flores MD   2 puff at 07/27/24 2325    sodium chloride 0.9 % flush bag 25 mL  25 mL Intravenous PRN Tamiko Srinivasan DO        acetaminophen (TYLENOL) tablet 650 mg  650 mg Oral Q4H PRN Joana Up MD   650 mg at 07/28/24 0831    ondansetron (ZOFRAN) injection 4 mg  4 mg Intravenous Q8H PRN Joana Up MD        hydrALAZINE (APRESOLINE) tablet 25 mg  25 mg Oral Q4H PRN Joana Up MD        melatonin tablet 3 mg  3 mg Oral QHS PRN Joana Up MD        benzonatate (TESSALON PERLES) capsule 100 mg  100 mg Oral TID PRN Joana Up MD        sodium chloride 0.9 % flush bag 25 mL  25 mL Intravenous PRN Joana Up MD        traMADol (ULTRAM) tablet 25 mg  25 mg Oral Q12H PRN Joana Up MD        traMADol (ULTRAM) tablet 50 mg  50 mg Oral Q6H PRN Joana Up MD   50 mg at 07/27/24 1124    ketorolac (TORADOL) injection 15 mg  15 mg Intravenous Q6H PRN Joana Up MD        ipratropium (ATROVENT) 0.02 % nebulizer solution 0.5 mg  0.5 mg Nebulization Q6H PRN Joana Up MD           Objective:    Vital Last Value 24-Hour Range   Temperature 98.8 °F (37.1 °C) Temp  Min: 98.1 °F (36.7 °C)  Max: 98.8 °F (37.1 °C)   Pulse 87 Pulse  Min: 86  Max: 90   Respiratory 18 Resp  Min: 16  Max: 18   Non-Invasive  Blood Pressure 115/69 (07/28/24 0742) BP  Min: 105/67  Max: 116/71   Pulse Oximetry 93 % SpO2  Min: 93 %  Max: 96 %   Arterial   Blood Pressure   No data recorded     Vital Today Admit   Weight 66.8 kg (147 lb 4.3 oz) Weight: 63.5 kg (140 lb)   Height N/A Height: 4' 11\" (149.9 cm)   Body Mass Index N/A BMI (Calculated): 28.28     Intake/Output last 3 shifts:  I/O last 3 completed shifts:  In: 1260 [P.O.:960; IV Piggyback:300]  Out: 1550 [Urine:1550]    Intake/Output this shift:  No intake/output data recorded.    Fiber Restricted Diet    Last  Stool Occurrence: 1 (07/27/24 1500)        Visit Vitals  /69 (BP Location: RUE - Right upper extremity, Patient Position: Supine)   Pulse 87   Temp 98.8 °F (37.1 °C) (Oral)   Resp 18   Ht 4' 11\" (1.499 m)   Wt 66.8 kg (147 lb 4.3 oz)   LMP 09/05/2011   SpO2 93%   BMI 29.74 kg/m²       Exam:   Lungs: CTA B  Abdomen: Soft, slightly tender in suprapubic area and left lower quadrant, nondistended, positive bowel sounds.  No signs of peritonitis.  Extremities: Within normal limits    Imaging:    No results found for this or any previous visit.      Laboratory Results:  Recent Labs   Lab 07/28/24  0408 07/27/24  0652 07/27/24  0459 07/26/24  1426 07/26/24  1222   WBC 9.2 8.9  --   --  9.9   HCT 29.3* 29.2*  --   --  33.2*   HGB 9.4* 9.5*  --   --  11.0*    247  --   --  256   INR  --   --   --  1.0  --    PTT  --   --   --  28  --    BUN 4*  --  7  --  10   CREATININE 0.69  --  0.71  --  0.72   SODIUM 137  --  138  --  137   POTASSIUM 4.0  --  4.0  --  3.4   CHLORIDE 103  --  105  --  101   AST  --   --   --   --  38*   BILIRUBIN  --   --   --   --  0.8   CO2 25  --  23  --  28   GLUCOSE 88  --  94  --  88   ALBUMIN  --   --   --   --  3.3*   LIPA  --   --   --   --  19         Assessment:    Patient with the acute diverticulitis with conservative therapy.  Okay to discharge patient home on oral antibiotic.  Follow-up in my office in 1 week.  All patient questions were answered to his satisfaction.          Jose Morillo MD       normal/ROM intact

## 2024-11-07 NOTE — ED PROVIDER NOTE - CLINICAL SUMMARY MEDICAL DECISION MAKING FREE TEXT BOX
pt sent in for afib - hx of htn/hld/paroxysmal afib (eliquis), c/o sob and palpitations, seen by own card yest and was in rapid afib and in chf (given lasix), ekg afib - rate controlled, cxr clear, pro bnp elevated. EP recommending admission for monitoring / iris, and ct surg eval for mitral stenosis.

## 2024-11-07 NOTE — CONSULT NOTE ADULT - SUBJECTIVE AND OBJECTIVE BOX
Physician: Dr. Lam     Requesting Physician: Dr. Hallman     HISTORY OF PRESENT ILLNESS:  59y Female with PMHx of HTN, HLKD, A-Flutter (on Eliquis but only taking QD) s/p DCCV x 1 and ablation (9/2022), asthma, rheumatic mitral stenosis, ovarian cancer (2006, remission) who presented to ED with complaints of worsening SOB/TYSON and palpitations. Patient reports she has been having worsening SOB and palpitations since 11/1. She was evaluated by Dr. Hallman in the office on 11/6 and found to be in AF. Her Toprool 50 mg was switched to Lopressor 50 mg q12 and she acheived rate control. Her EP physician Dr. Horowitz was contacted and recommended patient to come to the ER for cardioversion and possible intervention for moderate MS. Structural heart was consulted for mitral stenosis     PAST MEDICAL & SURGICAL HISTORY:  HTN (hypertension)      Atrial flutter      History of hydrocelectomy    MEDICATIONS  (STANDING):  heparin  Infusion.  Unit(s)/Hr (17 mL/Hr) IV Continuous <Continuous>    MEDICATIONS  (PRN):      Allergies    No Known Allergies    Intolerances        SOCIAL HISTORY:  Smoker:  YES / NO        PACK YEARS:                         WHEN QUIT?  ETOH use:  YES / NO               FREQUENCY / QUANTITY:  Ilicit Drug use:  YES / NO  Occupation:  Assisted device use (Cane / Walker):  Live with:    FAMILY HISTORY:      Review of Systems:  CONSTITUTIONAL: Denies fevers / chills, sweats, fatigue, weight loss, weight gain                                       NEURO:  Denies parathesias, seizures, syncope, confusion                                                                                  EYES:  Denies blurry vision, discharge, pain, loss of vision                                                                                    ENMT:  Denies difficulty hearing, vertigo, dysphagia, epistaxis, recent dental work                                       CV:  Denies chest pain, palpitations, TYSON, orthopnea                                                                                           RESPIRATORY:  Denies wWheezing, SOB, cough / sputum, hemoptysis                                                               GI:  Denies nausea, vomiting, diarrhea, constipation, melena                                                                          : Denies hematuria, dysuria, urgency, incontinence                                                                                          MUSKULOSKELETAL:  Denies arthritis, joint swelling, muscle weakness                                                             SKIN/BREAST:  Denies rash, itching, hair loss, masses                                                                                              PSYCH:  Denies depression, anxiety, suicidal ideation                                                                                                HEME/LYMPH:  Denies bruises easily, enlarged lymph nodes, tender lymph nodes                                          ENDOCRINE:  Denies cold intolerance, heat intolerance, polydipsia                                                                      Vital Signs Last 24 Hrs  T(C): 36.7 (07 Nov 2024 07:51), Max: 36.7 (07 Nov 2024 07:51)  T(F): 98.1 (07 Nov 2024 07:51), Max: 98.1 (07 Nov 2024 07:51)  HR: 88 (07 Nov 2024 07:51) (75 - 88)  BP: 137/98 (07 Nov 2024 07:51) (119/87 - 137/98)  BP(mean): --  RR: 17 (07 Nov 2024 07:51) (16 - 17)  SpO2: 97% (07 Nov 2024 07:51) (96% - 97%)    Parameters below as of 07 Nov 2024 07:26  Patient On (Oxygen Delivery Method): room air        Physical Exam  General: Sitting in bed comfortably in NAD  Neuro: A&Ox3, no focal deficits   HEENT: NCAT, EOMI   Cardiac: Irregular rate/rhythm  normal S1 and S2. + murmur   Pulm: Breathing comfortably on RA. No signs of respiratory distress. Lungs are CTA b/l without wheezes, rales, or rhonchi   Abdomen: Soft, non-distended, non-tender. + bowel sounds   : No lazar  Extremities: Warm and well perfused, no peripheral edema, distal pulses 2+. No calf tenderness. SCDs and TEDs in place  MSK: Full AROM                                                            LABS:                        14.2   8.44  )-----------( 268      ( 07 Nov 2024 07:55 )             42.3     11-07    142  |  105  |  22  ----------------------------<  117[H]  4.1   |  23  |  1.15    Ca    10.0      07 Nov 2024 07:55      PT/INR - ( 07 Nov 2024 07:55 )   PT: 13.2 sec;   INR: 1.13          PTT - ( 07 Nov 2024 07:55 )  PTT:45.0 sec  Urinalysis Basic - ( 07 Nov 2024 07:55 )    Color: x / Appearance: x / SG: x / pH: x  Gluc: 117 mg/dL / Ketone: x  / Bili: x / Urobili: x   Blood: x / Protein: x / Nitrite: x   Leuk Esterase: x / RBC: x / WBC x   Sq Epi: x / Non Sq Epi: x / Bacteria: x              RADIOLOGY & ADDITIONAL STUDIES:  CAROTID U/S:    CXR:    CT Scan:    EKG:    TTE / SILVERIO:    Cardiac Cath: Physician: Dr. Lam     Requesting Physician: Dr. Hallman     HISTORY OF PRESENT ILLNESS:  59y Female with PMHx of HTN, HLKD, A-Flutter (on Eliquis but only taking QD) s/p DCCV x 1 and ablation (9/2022), asthma, rheumatic mitral stenosis, ovarian cancer (2006, remission) who presented to ED with complaints of worsening SOB/TYSON and palpitations. Patient reports she has been having worsening SOB and palpitations since 11/1. She was evaluated by Dr. Hallman in the office on 11/6 and found to be in AF. Her Toprool 50 mg was switched to Lopressor 50 mg q12 and she acheived rate control. Her EP physician Dr. Horowitz was contacted and recommended patient to come to the ER for cardioversion and possible intervention for moderate MS. Structural heart was consulted for mitral stenosis     PAST MEDICAL & SURGICAL HISTORY:  HTN (hypertension)      Atrial flutter      History of hydrocelectomy    MEDICATIONS  (STANDING):  heparin  Infusion.  Unit(s)/Hr (17 mL/Hr) IV Continuous <Continuous>    MEDICATIONS  (PRN):      Allergies    No Known Allergies    Intolerances        SOCIAL HISTORY:    FAMILY HISTORY:      Review of Systems:  CONSTITUTIONAL: Denies fevers / chills, sweats, fatigue, weight loss, weight gain                                       NEURO:  Denies parathesias, seizures, syncope, confusion                                                                                  EYES:  Denies blurry vision, discharge, pain, loss of vision                                                                                    ENMT:  Denies difficulty hearing, vertigo, dysphagia, epistaxis, recent dental work                                       CV:  Denies chest pain, palpitations, TYSON, orthopnea                                                                                           RESPIRATORY:  Denies wWheezing, SOB, cough / sputum, hemoptysis                                                               GI:  Denies nausea, vomiting, diarrhea, constipation, melena                                                                          : Denies hematuria, dysuria, urgency, incontinence                                                                                          MUSKULOSKELETAL:  Denies arthritis, joint swelling, muscle weakness                                                             SKIN/BREAST:  Denies rash, itching, hair loss, masses                                                                                              PSYCH:  Denies depression, anxiety, suicidal ideation                                                                                                HEME/LYMPH:  Denies bruises easily, enlarged lymph nodes, tender lymph nodes                                          ENDOCRINE:  Denies cold intolerance, heat intolerance, polydipsia                                                                      Vital Signs Last 24 Hrs  T(C): 36.7 (07 Nov 2024 07:51), Max: 36.7 (07 Nov 2024 07:51)  T(F): 98.1 (07 Nov 2024 07:51), Max: 98.1 (07 Nov 2024 07:51)  HR: 88 (07 Nov 2024 07:51) (75 - 88)  BP: 137/98 (07 Nov 2024 07:51) (119/87 - 137/98)  BP(mean): --  RR: 17 (07 Nov 2024 07:51) (16 - 17)  SpO2: 97% (07 Nov 2024 07:51) (96% - 97%)    Parameters below as of 07 Nov 2024 07:26  Patient On (Oxygen Delivery Method): room air        Physical Exam  General: Sitting in bed comfortably in NAD  Neuro: A&Ox3, no focal deficits   HEENT: NCAT, EOMI   Cardiac: Irregular rate/rhythm  normal S1 and S2. + murmur   Pulm: Breathing comfortably on RA. No signs of respiratory distress. Lungs are CTA b/l without wheezes, rales, or rhonchi   Abdomen: Soft, non-distended, non-tender. + bowel sounds   : No lazar  Extremities: Warm and well perfused, no peripheral edema, distal pulses 2+. No calf tenderness. SCDs and TEDs in place  MSK: Full AROM                                                            LABS:                        14.2   8.44  )-----------( 268      ( 07 Nov 2024 07:55 )             42.3     11-07    142  |  105  |  22  ----------------------------<  117[H]  4.1   |  23  |  1.15    Ca    10.0      07 Nov 2024 07:55      PT/INR - ( 07 Nov 2024 07:55 )   PT: 13.2 sec;   INR: 1.13          PTT - ( 07 Nov 2024 07:55 )  PTT:45.0 sec  Urinalysis Basic - ( 07 Nov 2024 07:55 )    Color: x / Appearance: x / SG: x / pH: x  Gluc: 117 mg/dL / Ketone: x  / Bili: x / Urobili: x   Blood: x / Protein: x / Nitrite: x   Leuk Esterase: x / RBC: x / WBC x   Sq Epi: x / Non Sq Epi: x / Bacteria: x              RADIOLOGY & ADDITIONAL STUDIES:  < from: SILVERIO w/Doppler (11.07.24 @ 11:41) >    CONCLUSIONS:     1. Normal left and right ventricular size and systolic function.   2. Severely dilated left atrium.   3. No LA/RA/LYLY/RAA thrombus seen.   4. The mitral valve is moderately thickened. The posterior leaflet is   mildly restricted. Mitral valve is likely rhuematic. There is a 0.7 cmx   0.2 cm mobile echodensity on the atrial side of the anterior leaflet of   the mitral valve, probably representing fibrinous material, part of "wear   and tear" of the valve. There is moderate mitral stenosis. The mean   transvalvular gradient is 8.00 mmHg at a heart rate of 86 bpm. The mitral   valve area estimated via planimetry is 1.70 cm². There is   mild-to-moderate mitral regurgitation.   5. Fibrocalcific tricuspid aortic valve without significant stenosis.   There is trace aortic regurgitation.   6. There is mild pulmonary hypertension, pulmonary artery systolic   pressure is 48 mmHg.   7. No pericardial effusion.    < end of copied text >

## 2024-11-07 NOTE — ED PROVIDER NOTE - OBJECTIVE STATEMENT
The pt is a 60 y/o F, who presents to ED c/o palpitations and sob x few d. PMH HTN, HLD, paroxysmal afib (eliquis). Was seen by cards yest and told that was in rapid afib, today spoke to dr fajardo and told to come to ED. Pt c/o chest "tightness", having palpitations, and feeling sob - worse on exertion. Denies fevers, chills, dizziness, syncope, n/v/d, abd pain.

## 2024-11-07 NOTE — H&P ADULT - NSHPLABSRESULTS_GEN_ALL_CORE
14.2   8.44  )-----------( 268      ( 07 Nov 2024 07:55 )             42.3       11-07    142  |  105  |  22  ----------------------------<  117[H]  4.1   |  23  |  1.15    Ca    10.0      07 Nov 2024 07:55        PT/INR - ( 07 Nov 2024 07:55 )   PT: 13.2 sec;   INR: 1.13          PTT - ( 07 Nov 2024 07:55 )  PTT:45.0 sec          Urinalysis Basic - ( 07 Nov 2024 07:55 )    Color: x / Appearance: x / SG: x / pH: x  Gluc: 117 mg/dL / Ketone: x  / Bili: x / Urobili: x   Blood: x / Protein: x / Nitrite: x   Leuk Esterase: x / RBC: x / WBC x   Sq Epi: x / Non Sq Epi: x / Bacteria: x

## 2024-11-07 NOTE — CONSULT NOTE ADULT - ASSESSMENT
59y Female with PMHx of HTN, HLD, A-Flutter (on Eliquis but only taking QD) s/p DCCV x 1 and ablation (9/2022), asthma, rheumatic mitral stenosis, ovarian cancer (2006, remission) who presented to ED with complaints of worsening SOB/TYSON and palpitations. Patient reports she has been having worsening SOB and palpitations since 11/1. She was evaluated by Dr. Hallman in the office on 11/6 and found to be in AF. Her Toprool 50 mg was switched to Lopressor 50 mg q12 and she acheived rate control. Her EP physician Dr. Horowitz was contacted and recommended patient to come to the ER for cardioversion and possible intervention for moderate MS. Structural heart was consulted for mitral stenosis     Plan     Problem 1: Mitral Stenosis   - most recent TTE in May 2023 shows moderate MS. Please obtain repeat echo while in house   - pending SILVERIO for cardioversion and assessment of mitral stenosis   - structural heart consulted for possible intervention   - will follow-up results of SILVERIO and case will be discussed with Dr. Lam     Problem 2: A-Flutter   - patient with new onset AF after cardioversion   - only taking eliquis 5 mg once a day, pending SILVERIO to r/o LYLY clot   - cardioversion per EP     Problem 3: HTN   - BP management per primary team     Problem 4: HLD  - continue statin   - remainder of care per primary team     Case discussed with Dr. Lam who agrees with above plan     Consult PA Number: [phone]   59y Female with PMHx of HTN, HLD, A-Flutter (on Eliquis but only taking QD) s/p DCCV x 1 and ablation (9/2022), asthma, rheumatic mitral stenosis, ovarian cancer (2006, remission) who presented to ED with complaints of worsening SOB/TYSON and palpitations. Patient reports she has been having worsening SOB and palpitations since 11/1. She was evaluated by Dr. Hallman in the office on 11/6 and found to be in AF. Her Toprool 50 mg was switched to Lopressor 50 mg q12 and she acheived rate control. Her EP physician Dr. Horowitz was contacted and recommended patient to come to the ER for cardioversion and possible intervention for moderate MS. Structural heart was consulted for mitral stenosis     Plan     Problem 1: Mitral Stenosis   - most recent TTE in May 2023 shows moderate MS.  - SILVERIO with moderate MS, 0.7 x 0.2 cm mobile echodensity on valve recommending blood cultures   Please obtain repeat echo while in house   - no acute structural intervention this admission, can follow-up with Dr. Lam in one month   - structural heart consulted for possible intervention   - will follow-up results of SILVERIO and case will be discussed with Dr. Lam     Problem 2: A-Flutter   - patient with new onset AF after cardioversion   - only taking eliquis 5 mg once a day, SILVERIO without clot, successfully cardioverted   - cardioversion per EP     Problem 3: HTN   - BP management per primary team     Problem 4: HLD  - continue statin   - remainder of care per primary team     Case discussed with Dr. Lam who agrees with above plan     Consult PA Number: [phone]

## 2024-11-07 NOTE — ED ADULT NURSE REASSESSMENT NOTE - NS ED NURSE REASSESS COMMENT FT1
Assumed care of pt. Pmhx AFib on Eliquis c/o palpitations, sent by cardiologist. Rate 80's-90's AFib in ED, resting comfortably in bed on cardiac monitor. Awaiting lab results.

## 2024-11-07 NOTE — CONSULT NOTE ADULT - ASSESSMENT
59F PMH HTN, HLD, aflutter s/p DCCV x1 and ablation (9/2022), asthma, rheumatic mitral stenosis, ovarian cancer (2006, in remission) who presented to ED with complaints of worsening SOB/TYSON and palpitations after being directed to come by her outpatient EP cardiologist Dr. Horowitz for new afib. She saw her outpatient general cardiologist Dr. Hallman yesterday 11/6/24 who did EKG in office showing afib, at that time was directed to switch from toprol 50mg daily to lopressor 50mg BID. Also called EP Dr. Horowitz who directed her to come to ED for cardioversion and further evaluation of her mitral valve for possible intervention given history of moderate mitral stenosis. EP consulted for management of afib and possible DCCV.    Review of studies:  EKG 11/7/24: afib, rate 86  EKG 11/6/24 (outpatient): afib, rate 116   TTE 5/31/23: Mildly dilated left ventricular cavity size. The left ventricular wall thickness is normal. The left ventricular systolic function is normal with an ejection fraction visually estimated at 55 to 60 %. There are no regional wall motion abnormalities seen. The left atrium is moderately dilated. Mild moderate tricuspid regurgitation. Estimated pulmonary artery systolic pressure is 32 mmHg. Mild aortic regurgitation. There is moderate mitral valve stenosis and moderate MR.     Home medications (cardiac): eliquis 5mg BID (however patient reports she has been taking daily), lopressor 50mg BID, rosuvastatin 10mg qd, valsartan 320mg qd, HCTZ 12.5mg qd     Recommendations:    #afib   -EKG in ED rate controlled afib as above   -CHADSVASC at least 2; 4 with possible previous stroke   -would switch eliquis to warfarin given valvular afib, will need bridging with heparin   -patient should have SILVERIO to r/o LYLY thrombus given questionably taking eliquis daily instead of BID; also for structural eval of mitral valve   -can c/w PO lopressor 50mg BID  -will evaluate for DCCV after SILVERIO done   -appreciate structural heart recs for intervention on mitral valve, will need to coordinate optimal timing of DCCV       Recommendations discussed with EP attending Dr. Jacinto   Recommendations not final until attested by attending

## 2024-11-07 NOTE — H&P ADULT - ASSESSMENT
59 YOF, +family history of CAD (father) with PMHx of AFIB (on Toprol and Eliquis last dose 11/7), questionable CVA in 2022 after diagnosis of AFIB (no neuro deficits), moderate rheumatic mitral stenosis, HTN, HLD presents to [facility] for SOB + Afib.  Patient was recently changed from Toprol to Lopressor 2/2 increased episodes of AFIB with RVR. In the ED, Labs significant for BNP 4131, Trop T 15. Patient remains in a rate controlled AFIB, stable SBPs and without acute complaints. No medications given in ED. EP and CTSx consulted and are following. Patient is now admitted to cardiac tele for management of atrial fibrillation and work up of mitral stenosis with plans for TTE, SILVERIO and DCCV.

## 2024-11-07 NOTE — H&P ADULT - PROBLEM SELECTOR PLAN 1
Diagnosed with AF in 2022-- per patient she had a ?CVA at the time of diagnosis (confirmed with brain MRI however does not have any copies of imaging results), she denies any neuro deficits   -AFIB rates elevated to the 110s x2 days--- presented to outpatient cards yesterday and was switched from Toprol 50mg QD to Lopressor 50mg BID   -AC: Eliquis 5mg BID   -In ED: EKG w/ rate controlled AFIB, Trop T 15, VSS, on room air in no distress, no interventions given     PLAN  -EP consulted (follows with Dr. Horowitz outpatient) with plans for SILVERIO/DCCV today   -? transition to Coumadin if CTSx does not plan to do any procedure this admission   -AC: c/w heparin gtt for now   -Rate: c/w Lopressor 50mg BID   -f/u TFTs in AM

## 2024-11-08 ENCOUNTER — TRANSCRIPTION ENCOUNTER (OUTPATIENT)
Age: 59
End: 2024-11-08

## 2024-11-08 ENCOUNTER — NON-APPOINTMENT (OUTPATIENT)
Age: 59
End: 2024-11-08

## 2024-11-08 VITALS
SYSTOLIC BLOOD PRESSURE: 134 MMHG | OXYGEN SATURATION: 95 % | RESPIRATION RATE: 18 BRPM | HEART RATE: 71 BPM | DIASTOLIC BLOOD PRESSURE: 81 MMHG

## 2024-11-08 LAB
A1C WITH ESTIMATED AVERAGE GLUCOSE RESULT: 5.8 % — HIGH (ref 4–5.6)
ADD ON TEST-SPECIMEN IN LAB: SIGNIFICANT CHANGE UP
ANION GAP SERPL CALC-SCNC: 12 MMOL/L — SIGNIFICANT CHANGE UP (ref 5–17)
APTT BLD: >200 SEC — CRITICAL HIGH (ref 24.5–35.6)
APTT BLD: >200 SEC — CRITICAL HIGH (ref 24.5–35.6)
BUN SERPL-MCNC: 19 MG/DL — SIGNIFICANT CHANGE UP (ref 7–23)
CALCIUM SERPL-MCNC: 9.3 MG/DL — SIGNIFICANT CHANGE UP (ref 8.4–10.5)
CHLORIDE SERPL-SCNC: 106 MMOL/L — SIGNIFICANT CHANGE UP (ref 96–108)
CHOLEST SERPL-MCNC: 210 MG/DL — HIGH
CO2 SERPL-SCNC: 21 MMOL/L — LOW (ref 22–31)
CREAT SERPL-MCNC: 0.9 MG/DL — SIGNIFICANT CHANGE UP (ref 0.5–1.3)
EGFR: 73 ML/MIN/1.73M2 — SIGNIFICANT CHANGE UP
ESTIMATED AVERAGE GLUCOSE: 120 MG/DL — HIGH (ref 68–114)
GLUCOSE SERPL-MCNC: 124 MG/DL — HIGH (ref 70–99)
HCT VFR BLD CALC: 37.2 % — SIGNIFICANT CHANGE UP (ref 34.5–45)
HDLC SERPL-MCNC: 65 MG/DL — SIGNIFICANT CHANGE UP
HGB BLD-MCNC: 12 G/DL — SIGNIFICANT CHANGE UP (ref 11.5–15.5)
LIPID PNL WITH DIRECT LDL SERPL: 129 MG/DL — HIGH
MAGNESIUM SERPL-MCNC: 2.1 MG/DL — SIGNIFICANT CHANGE UP (ref 1.6–2.6)
MCHC RBC-ENTMCNC: 28.3 PG — SIGNIFICANT CHANGE UP (ref 27–34)
MCHC RBC-ENTMCNC: 32.3 G/DL — SIGNIFICANT CHANGE UP (ref 32–36)
MCV RBC AUTO: 87.7 FL — SIGNIFICANT CHANGE UP (ref 80–100)
NON HDL CHOLESTEROL: 145 MG/DL — HIGH
NRBC # BLD: 0 /100 WBCS — SIGNIFICANT CHANGE UP (ref 0–0)
PLATELET # BLD AUTO: 221 K/UL — SIGNIFICANT CHANGE UP (ref 150–400)
POTASSIUM SERPL-MCNC: 4.3 MMOL/L — SIGNIFICANT CHANGE UP (ref 3.5–5.3)
POTASSIUM SERPL-SCNC: 4.3 MMOL/L — SIGNIFICANT CHANGE UP (ref 3.5–5.3)
RBC # BLD: 4.24 M/UL — SIGNIFICANT CHANGE UP (ref 3.8–5.2)
RBC # FLD: 14.2 % — SIGNIFICANT CHANGE UP (ref 10.3–14.5)
SODIUM SERPL-SCNC: 139 MMOL/L — SIGNIFICANT CHANGE UP (ref 135–145)
T4 AB SER-ACNC: 4.74 UG/DL — SIGNIFICANT CHANGE UP (ref 4.5–11.7)
TRIGL SERPL-MCNC: 92 MG/DL — SIGNIFICANT CHANGE UP
TSH SERPL-MCNC: 1.88 UIU/ML — SIGNIFICANT CHANGE UP (ref 0.27–4.2)
WBC # BLD: 6.64 K/UL — SIGNIFICANT CHANGE UP (ref 3.8–10.5)
WBC # FLD AUTO: 6.64 K/UL — SIGNIFICANT CHANGE UP (ref 3.8–10.5)

## 2024-11-08 PROCEDURE — 71045 X-RAY EXAM CHEST 1 VIEW: CPT

## 2024-11-08 PROCEDURE — 36415 COLL VENOUS BLD VENIPUNCTURE: CPT

## 2024-11-08 PROCEDURE — 80061 LIPID PANEL: CPT

## 2024-11-08 PROCEDURE — 85610 PROTHROMBIN TIME: CPT

## 2024-11-08 PROCEDURE — 93005 ELECTROCARDIOGRAM TRACING: CPT

## 2024-11-08 PROCEDURE — 85730 THROMBOPLASTIN TIME PARTIAL: CPT

## 2024-11-08 PROCEDURE — 83735 ASSAY OF MAGNESIUM: CPT

## 2024-11-08 PROCEDURE — 84443 ASSAY THYROID STIM HORMONE: CPT

## 2024-11-08 PROCEDURE — 80048 BASIC METABOLIC PNL TOTAL CA: CPT

## 2024-11-08 PROCEDURE — 93312 ECHO TRANSESOPHAGEAL: CPT

## 2024-11-08 PROCEDURE — 83880 ASSAY OF NATRIURETIC PEPTIDE: CPT

## 2024-11-08 PROCEDURE — 99231 SBSQ HOSP IP/OBS SF/LOW 25: CPT

## 2024-11-08 PROCEDURE — 84439 ASSAY OF FREE THYROXINE: CPT

## 2024-11-08 PROCEDURE — 84436 ASSAY OF TOTAL THYROXINE: CPT

## 2024-11-08 PROCEDURE — 85025 COMPLETE CBC W/AUTO DIFF WBC: CPT

## 2024-11-08 PROCEDURE — 83036 HEMOGLOBIN GLYCOSYLATED A1C: CPT

## 2024-11-08 PROCEDURE — C8929: CPT

## 2024-11-08 PROCEDURE — 87040 BLOOD CULTURE FOR BACTERIA: CPT

## 2024-11-08 PROCEDURE — 99285 EMERGENCY DEPT VISIT HI MDM: CPT | Mod: 25

## 2024-11-08 PROCEDURE — 99238 HOSP IP/OBS DSCHRG MGMT 30/<: CPT

## 2024-11-08 PROCEDURE — 85027 COMPLETE CBC AUTOMATED: CPT

## 2024-11-08 PROCEDURE — 84484 ASSAY OF TROPONIN QUANT: CPT

## 2024-11-08 RX ORDER — HYDROCHLOROTHIAZIDE 50 MG
1 TABLET ORAL
Refills: 0 | DISCHARGE

## 2024-11-08 RX ORDER — VALSARTAN 160 MG/1
1 TABLET ORAL
Refills: 0 | DISCHARGE

## 2024-11-08 RX ORDER — AMIODARONE HCL 200 MG
1 TABLET ORAL
Qty: 30 | Refills: 0
Start: 2024-11-08 | End: 2024-12-07

## 2024-11-08 RX ORDER — APIXABAN 5 MG/1
5 TABLET, FILM COATED ORAL ONCE
Refills: 0 | Status: COMPLETED | OUTPATIENT
Start: 2024-11-08 | End: 2024-11-08

## 2024-11-08 RX ORDER — AMIODARONE HCL 200 MG
400 TABLET ORAL ONCE
Refills: 0 | Status: COMPLETED | OUTPATIENT
Start: 2024-11-08 | End: 2024-11-08

## 2024-11-08 RX ORDER — AMIODARONE HCL 200 MG
1 TABLET ORAL
Qty: 21 | Refills: 0
Start: 2024-11-08 | End: 2024-11-28

## 2024-11-08 RX ADMIN — APIXABAN 5 MILLIGRAM(S): 5 TABLET, FILM COATED ORAL at 12:40

## 2024-11-08 RX ADMIN — Medication 400 MILLIGRAM(S): at 14:43

## 2024-11-08 RX ADMIN — HEPARIN SODIUM 1100 UNIT(S)/HR: 10000 INJECTION INTRAVENOUS; SUBCUTANEOUS at 05:09

## 2024-11-08 RX ADMIN — HEPARIN SODIUM 1100 UNIT(S)/HR: 10000 INJECTION INTRAVENOUS; SUBCUTANEOUS at 08:08

## 2024-11-08 RX ADMIN — Medication 50 MILLIGRAM(S): at 05:09

## 2024-11-08 RX ADMIN — HEPARIN SODIUM 1100 UNIT(S)/HR: 10000 INJECTION INTRAVENOUS; SUBCUTANEOUS at 06:17

## 2024-11-08 RX ADMIN — HEPARIN SODIUM 0 UNIT(S)/HR: 10000 INJECTION INTRAVENOUS; SUBCUTANEOUS at 02:38

## 2024-11-08 NOTE — PROVIDER CONTACT NOTE (CRITICAL VALUE NOTIFICATION) - ACTION/TREATMENT ORDERED:
HOLLI Castano aware. Heparin gtt paused for 1 hour, restart at 14cc/hr
Stop heparin gtt for 60 minutes and restart at 11 mL/hr per nomogram order.

## 2024-11-08 NOTE — PROGRESS NOTE ADULT - ASSESSMENT
59F PMH HTN, HLD, aflutter s/p DCCV x1 and ablation (9/2022), asthma, rheumatic mitral stenosis, ovarian cancer (2006, in remission) who presented to ED with complaints of worsening SOB/TYSON and palpitations after being directed to come by her outpatient EP cardiologist Dr. Horowitz for new afib. She saw her outpatient general cardiologist Dr. Hallman yesterday 11/6/24 who did EKG in office showing afib, at that time was directed to switch from toprol 50mg daily to lopressor 50mg BID. Also called EP Dr. Horowitz who directed her to come to ED for cardioversion and further evaluation of her mitral valve for possible intervention given history of moderate mitral stenosis. EP consulted for management of afib and DCCV.    Review of studies:  EKG 11/7/24: afib, rate 86  EKG 11/6/24 (outpatient): afib, rate 116   TTE 11/7/24: Normal left and right ventricular size and systolic function. EF 60-65% Severely dilated left atrium. The mitral leaflets are thickened with diastolic doming and reduced excursion consistent with rheumatic mitral disease. There is moderate mitral stenosis. The mean transvalvular gradient is 9.00 mmHg at a heart rate of 97 bpm. The mitral valve area estimated via planimetry is 1.61 cm². There is mild-to-moderate mitral regurgitation. Aortic sclerosis without significant stenosis. Trace aortic regurgitation. Mild-to-moderate tricuspid regurgitation. No evidence of pulmonary hypertension, pulmonary artery systolic pressure is 28 mmHg.   SILVERIO 11/7/24: Normal left and right ventricular size and systolic function. Severely dilated left atrium. No LA/RA/LYLY/RAA thrombus seen. The mitral valve is moderately thickened. The posterior leaflet is mildly restricted. Mitral valve is likely rhuematic. There is a 0.7 cm x0.2 cm mobile echodensity on the atrial side of the anterior leaflet of the mitral valve, probably representing fibrinous material, part of "wear and tear" of the valve. There is moderate mitral stenosis. The mean transvalvular gradient is 8.00 mmHg at a heart rate of 86 bpm. The mitral valve area estimated via planimetry is 1.70 cm². There is mild-to-moderate mitral regurgitation. Fibrocalcific tricuspid aortic valve without significant stenosis. There is trace aortic regurgitation. There is mild pulmonary hypertension, pulmonary artery systolic pressure is 48 mmHg.   TTE 5/31/23: Mildly dilated left ventricular cavity size. The left ventricular wall thickness is normal. The left ventricular systolic function is normal with an ejection fraction visually estimated at 55 to 60 %. There are no regional wall motion abnormalities seen. The left atrium is moderately dilated. Mild moderate tricuspid regurgitation. Estimated pulmonary artery systolic pressure is 32 mmHg. Mild aortic regurgitation. There is moderate mitral valve stenosis and moderate MR.     Home medications (cardiac): eliquis 5mg BID (however patient reports she has been taking daily), lopressor 50mg BID, rosuvastatin 10mg qd, valsartan 320mg qd, HCTZ 12.5mg qd     Recommendations:    #afib   -now s/p successful DCCV yesterday with conversion to NSR   -CHADSVASC at least 2; 4 with possible previous stroke   -per primary team plan to continue patient on eliquis; no plans at this time to start warfarin bridging   -can c/w PO lopressor 50mg BID  -recommend starting PO amiodarone 400mg daily x3 weeks ; then switch to 200mg daily going forward   -appreciate structural heart recs for intervention on mitral valve   -please ensure patient has outpatient follow up with EP Dr. Horowitz     Recommendations discussed with EP attending Dr. Jacinto   Recommendations not final until attested by attending    59F PMH HTN, HLD, aflutter s/p DCCV x1 and ablation (9/2022), asthma, rheumatic mitral stenosis, ovarian cancer (2006, in remission) who presented to ED with complaints of worsening SOB/TYSON and palpitations after being directed to come by her outpatient EP cardiologist Dr. Horowitz for new afib. EP consulted for management of afib and DCCV.    Review of studies:  EKG 11/7/24: afib, rate 86  EKG 11/6/24 (outpatient): afib, rate 116   TTE 11/7/24: Normal left and right ventricular size and systolic function. EF 60-65% Severely dilated left atrium. The mitral leaflets are thickened with diastolic doming and reduced excursion consistent with rheumatic mitral disease. There is moderate mitral stenosis. The mean transvalvular gradient is 9.00 mmHg at a heart rate of 97 bpm. The mitral valve area estimated via planimetry is 1.61 cm². There is mild-to-moderate mitral regurgitation. Aortic sclerosis without significant stenosis. Trace aortic regurgitation. Mild-to-moderate tricuspid regurgitation. No evidence of pulmonary hypertension, pulmonary artery systolic pressure is 28 mmHg.   SILVERIO 11/7/24: Normal left and right ventricular size and systolic function. Severely dilated left atrium. No LA/RA/LYLY/RAA thrombus seen. The mitral valve is moderately thickened. The posterior leaflet is mildly restricted. Mitral valve is likely rhuematic. There is a 0.7 cm x0.2 cm mobile echodensity on the atrial side of the anterior leaflet of the mitral valve, probably representing fibrinous material, part of "wear and tear" of the valve. There is moderate mitral stenosis. The mean transvalvular gradient is 8.00 mmHg at a heart rate of 86 bpm. The mitral valve area estimated via planimetry is 1.70 cm². There is mild-to-moderate mitral regurgitation. Fibrocalcific tricuspid aortic valve without significant stenosis. There is trace aortic regurgitation. There is mild pulmonary hypertension, pulmonary artery systolic pressure is 48 mmHg.   TTE 5/31/23: Mildly dilated left ventricular cavity size. The left ventricular wall thickness is normal. The left ventricular systolic function is normal with an ejection fraction visually estimated at 55 to 60 %. There are no regional wall motion abnormalities seen. The left atrium is moderately dilated. Mild moderate tricuspid regurgitation. Estimated pulmonary artery systolic pressure is 32 mmHg. Mild aortic regurgitation. There is moderate mitral valve stenosis and moderate MR.     Home medications (cardiac): eliquis 5mg BID (however patient reports she has been taking daily), lopressor 50mg BID, rosuvastatin 10mg qd, valsartan 320mg qd, HCTZ 12.5mg qd     Recommendations:    #afib   -now s/p successful DCCV yesterday with conversion to NSR   -CHADSVASC at least 2; 4 with possible previous stroke   -per primary team plan to continue patient on eliquis; no plans at this time to start warfarin bridging   -can c/w PO lopressor 50mg BID  -recommend starting PO amiodarone 400mg daily x3 weeks ; then switch to 200mg daily going forward   -appreciate structural heart recs for intervention on mitral valve   -please ensure patient has outpatient follow up with EP Dr. Horowitz     Recommendations discussed with EP attending Dr. Jacinto   Recommendations not final until attested by attending

## 2024-11-08 NOTE — DISCHARGE NOTE PROVIDER - NSDCCPCAREPLAN_GEN_ALL_CORE_FT
PRINCIPAL DISCHARGE DIAGNOSIS  Diagnosis: Atrial fibrillation  Assessment and Plan of Treatment: When you presented, your heart was in an irregular rhythm called atrial fibrillation. We did a procedure called a Cardioversion, which puts your heart back into a normal sinus rhythm.  You have a history of Atrial Fibrillation. This means your atrium do not contract properly and blood does not efficiently get pumped into the ventricles. This may lead to blood abnormally pooling in the ventricles and causing it to clot. This puts you at an increased risk for a stroke. You should continue taking Eliquis 5mg Twice Daily for stroke prevention and Metoprolol Tartrate (Lopressor) 50mg twice daily.  Per EP Recommendations, you have also been started on Amiodarone to help keep you into a normal rhythm.  Please take Amiodarone 400mg twice daily for three weeks (through Nov 29) and then decrease dose to Amiodarone 200mg once daily starting on Novemeber 30, 2024.    -Please follow-up withyour Cardiologist Dr Kulwinder Hallman on 11/15/24 at [address].    -Please follow-up with Ep Dr Horowitz in 2-3 weeks. Call office to schedule appointment [phone].      SECONDARY DISCHARGE DIAGNOSES  Diagnosis: Rheumatic mitral stenosis  Assessment and Plan of Treatment: You have known mitral valve disease known as mitral stenosis which is a narrowing of the valve causing restrictions how the valve opens and closes.  Please follow up with Dr Hallman regarding management of this.  Please also follow up with the [facility] Structural Heart Team Dr Mathieu Lam.  Please call for appointment [phone]    Diagnosis: HTN (hypertension)  Assessment and Plan of Treatment: Hypertension, commonly called high blood pressure, is when the force of blood pumping through your arteries is too strong. Hypertension forces your heart to work harder to pump blood. Your arteries may become narrow or stiff. Having untreated or uncontrolled hypertension for a long period of time can cause heart attack, stroke, kidney disease, and other problems. Maintain a healthy lifestyle and follow up with your primary care physician.  - Please continue to take your Metoprolol Tartrate 50mg twice daily and follow up with Dr Hallman for ongoing Blood Pressure management.    Diagnosis: HLD (hyperlipidemia)  Assessment and Plan of Treatment: Please continue taking your home dose Rosuvastatin.    Diagnosis: SOB (shortness of breath)  Assessment and Plan of Treatment: You presented with shortness of breath due to your Atrial fibrillation and valve disease.  You remain in a Normal Rhythm after Cardioversion and should follow-up with Dr Hallman to discuss management options for your Mitral Valve.     PRINCIPAL DISCHARGE DIAGNOSIS  Diagnosis: Atrial fibrillation  Assessment and Plan of Treatment: When you presented, your heart was in an irregular rhythm called atrial fibrillation. We did a procedure called a Cardioversion, which puts your heart back into a normal sinus rhythm.  You have a history of Atrial Fibrillation. This means your atrium do not contract properly and blood does not efficiently get pumped into the ventricles. This may lead to blood abnormally pooling in the ventricles and causing it to clot. This puts you at an increased risk for a stroke. You should continue taking Eliquis 5mg Twice Daily for stroke prevention and Metoprolol Tartrate (Lopressor) 50mg twice daily.  Per EP Recommendations, you have also been started on Amiodarone to help keep you into a normal rhythm.  Please take Amiodarone 400mg twice daily for three weeks (through Nov 29) and then decrease dose to Amiodarone 200mg once daily starting on Novemeber 30, 2024.    -Please follow-up withyour Cardiologist Dr Kulwinder Hallman on 11/15/24 at [address].    -Please follow-up with Ep Dr Horowitz on 11/26/24 at 10:30am.      SECONDARY DISCHARGE DIAGNOSES  Diagnosis: Rheumatic mitral stenosis  Assessment and Plan of Treatment: You have known mitral valve disease known as mitral stenosis which is a narrowing of the valve causing restrictions how the valve opens and closes.  Please follow up with Dr Hallman regarding management of this.  Please also follow up with the [facility] Structural Heart Team Dr Mathieu Lam.  Please call for appointment [phone]    Diagnosis: HTN (hypertension)  Assessment and Plan of Treatment: Hypertension, commonly called high blood pressure, is when the force of blood pumping through your arteries is too strong. Hypertension forces your heart to work harder to pump blood. Your arteries may become narrow or stiff. Having untreated or uncontrolled hypertension for a long period of time can cause heart attack, stroke, kidney disease, and other problems. Maintain a healthy lifestyle and follow up with your primary care physician.  - Please continue to take your Metoprolol Tartrate 50mg twice daily and follow up with Dr Hallman for ongoing Blood Pressure management.    Diagnosis: HLD (hyperlipidemia)  Assessment and Plan of Treatment: Please continue taking your home dose Rosuvastatin.    Diagnosis: SOB (shortness of breath)  Assessment and Plan of Treatment: You presented with shortness of breath due to your Atrial fibrillation and valve disease.  You remain in a Normal Rhythm after Cardioversion and should follow-up with Dr Hallman to discuss management options for your Mitral Valve.     PRINCIPAL DISCHARGE DIAGNOSIS  Diagnosis: Atrial fibrillation  Assessment and Plan of Treatment: When you presented, your heart was in an irregular rhythm called atrial fibrillation. We did a procedure called a Cardioversion, which puts your heart back into a normal sinus rhythm.  You have a history of Atrial Fibrillation. This means your atrium do not contract properly and blood does not efficiently get pumped into the ventricles. This may lead to blood abnormally pooling in the ventricles and causing it to clot. This puts you at an increased risk for a stroke. You should continue taking Eliquis 5mg Twice Daily for stroke prevention and Metoprolol Tartrate (Lopressor) 50mg twice daily.  Per EP Recommendations, you have also been started on Amiodarone to help keep you into a normal rhythm.  Please take Amiodarone 400mg once daily for three weeks (through Nov 29) and then decrease dose to Amiodarone 200mg once daily starting on Novemeber 30, 2024.    -Please follow-up withyour Cardiologist Dr Kulwinder Hallman on 11/15/24 at [address].    -Please follow-up with Ep Dr Horowitz on 11/26/24 at 10:30am.      SECONDARY DISCHARGE DIAGNOSES  Diagnosis: SOB (shortness of breath)  Assessment and Plan of Treatment: You presented with shortness of breath due to your Atrial fibrillation and valve disease.  You remain in a Normal Rhythm after Cardioversion and should follow-up with Dr Hallman to discuss management options for your Mitral Valve.    Diagnosis: Rheumatic mitral stenosis  Assessment and Plan of Treatment: You have known mitral valve disease known as mitral stenosis which is a narrowing of the valve causing restrictions how the valve opens and closes.  Please follow up with Dr Hallman regarding management of this.  Please also follow up with the [facility] Structural Heart Team Dr Mathieu Lam.  Please call for appointment [phone]    Diagnosis: HTN (hypertension)  Assessment and Plan of Treatment: Hypertension, commonly called high blood pressure, is when the force of blood pumping through your arteries is too strong. Hypertension forces your heart to work harder to pump blood. Your arteries may become narrow or stiff. Having untreated or uncontrolled hypertension for a long period of time can cause heart attack, stroke, kidney disease, and other problems. Maintain a healthy lifestyle and follow up with your primary care physician.  - Please continue to take your Metoprolol Tartrate 50mg twice daily and follow up with Dr Eisenstein for ongoing Blood Pressure management.    Diagnosis: HLD (hyperlipidemia)  Assessment and Plan of Treatment: Please continue taking your home dose Rosuvastatin.

## 2024-11-08 NOTE — DISCHARGE NOTE NURSING/CASE MANAGEMENT/SOCIAL WORK - NSDCPEFALRISK_GEN_ALL_CORE
For information on Fall & Injury Prevention, visit: [email or web address] OR  [email or web address] OR  https://www.cdc.gov/steadi/patient.html

## 2024-11-08 NOTE — PROGRESS NOTE ADULT - SUBJECTIVE AND OBJECTIVE BOX
SUBJECTIVE / INTERVAL HPI: S/p SILVERIO and successful DCCV yesterday with conversion to NSR. Remaining in NSR this morning. Patient seen and examined at bedside. Reports she is feeling much better and her symptoms have greatly improved.     PHYSICAL EXAM:    General: lying in bed in NAD   Cardiovascular: +S1/S2, regular rhythm, regular rate  Respiratory: CTA B/L; no W/R/C  Extremities: WWP; no LE edema  Neurological: AAOx3      VITAL SIGNS:  Vital Signs Last 24 Hrs  T(C): 36.6 (08 Nov 2024 05:08), Max: 36.7 (07 Nov 2024 20:15)  T(F): 97.8 (08 Nov 2024 05:08), Max: 98.1 (07 Nov 2024 20:15)  HR: 71 (08 Nov 2024 12:41) (59 - 71)  BP: 134/81 (08 Nov 2024 12:41) (112/63 - 136/79)  BP(mean): 90 (08 Nov 2024 05:08) (82 - 90)  RR: 18 (08 Nov 2024 12:41) (16 - 18)  SpO2: 95% (08 Nov 2024 12:41) (94% - 97%)    Parameters below as of 08 Nov 2024 12:41  Patient On (Oxygen Delivery Method): room air          MEDICATIONS:  MEDICATIONS  (STANDING):  influenza   Vaccine 0.5 milliLiter(s) IntraMuscular once  metoprolol tartrate 50 milliGRAM(s) Oral two times a day  rosuvastatin 10 milliGRAM(s) Oral at bedtime    MEDICATIONS  (PRN):      ALLERGIES:  Allergies    No Known Allergies    Intolerances        LABS:                        12.0   6.64  )-----------( 221      ( 08 Nov 2024 05:30 )             37.2     11-08    139  |  106  |  19  ----------------------------<  124[H]  4.3   |  21[L]  |  0.90    Ca    9.3      08 Nov 2024 05:30  Mg     2.1     11-08      PT/INR - ( 07 Nov 2024 07:55 )   PT: 13.2 sec;   INR: 1.13          PTT - ( 08 Nov 2024 10:40 )  PTT:>200.0 sec  Urinalysis Basic - ( 08 Nov 2024 05:30 )    Color: x / Appearance: x / SG: x / pH: x  Gluc: 124 mg/dL / Ketone: x  / Bili: x / Urobili: x   Blood: x / Protein: x / Nitrite: x   Leuk Esterase: x / RBC: x / WBC x   Sq Epi: x / Non Sq Epi: x / Bacteria: x      CAPILLARY BLOOD GLUCOSE          RADIOLOGY & ADDITIONAL TESTS: Reviewed.

## 2024-11-08 NOTE — DISCHARGE NOTE NURSING/CASE MANAGEMENT/SOCIAL WORK - FINANCIAL ASSISTANCE
[facility] provides services at a reduced cost to those who are determined to be eligible through [facility]’s financial assistance program. Information regarding [facility]’s financial assistance program can be found by going to [email or web address] or by calling [phone].

## 2024-11-08 NOTE — DISCHARGE NOTE PROVIDER - NSDCMRMEDTOKEN_GEN_ALL_CORE_FT
Eliquis 5 mg oral tablet: 1 tab(s) orally 2 times a day  hydroCHLOROthiazide 12.5 mg oral capsule: 1 cap(s) orally once a day  Lopressor 50 mg oral tablet: 1 tab(s) orally 2 times a day  Metoprolol Succinate ER 50 mg oral tablet, extended release: 1 tab(s) orally once a day  rosuvastatin 10 mg oral capsule: 1 cap(s) orally once a day (at bedtime)  valsartan 320 mg oral tablet: 1 tab(s) orally once a day   amiodarone 400 mg oral tablet: 1 tab(s) orally once a day x 3 weeks  Eliquis 5 mg oral tablet: 1 tab(s) orally 2 times a day  Lopressor 50 mg oral tablet: 1 tab(s) orally 2 times a day  rosuvastatin 10 mg oral capsule: 1 cap(s) orally once a day (at bedtime)   amiodarone 200 mg oral tablet: 1 tab(s) orally once a day Please start on 11/30/24  amiodarone 400 mg oral tablet: 1 tab(s) orally once a day x 3 weeks  Eliquis 5 mg oral tablet: 1 tab(s) orally 2 times a day  Lopressor 50 mg oral tablet: 1 tab(s) orally 2 times a day  rosuvastatin 10 mg oral capsule: 1 cap(s) orally once a day (at bedtime)

## 2024-11-08 NOTE — DISCHARGE NOTE PROVIDER - CARE PROVIDERS DIRECT ADDRESSES
[email or web address]

## 2024-11-08 NOTE — DISCHARGE NOTE PROVIDER - NSDCFUSCHEDAPPT_GEN_ALL_CORE_FT
Kulwinder Hallman  [facility] Physician [facility]  [facility] [address]  Scheduled Appointment: 11/15/2024

## 2024-11-08 NOTE — DISCHARGE NOTE NURSING/CASE MANAGEMENT/SOCIAL WORK - PATIENT PORTAL LINK FT
You can access the FollowMyHealth Patient Portal offered by [facility] by registering at the following website: [email or web address]. By joining [facility] FollowMyHealth portal, you will also be able to view your health information using other applications (apps) compatible with our system.

## 2024-11-08 NOTE — DISCHARGE NOTE PROVIDER - PROVIDER TOKENS
PROVIDER:[TOKEN:[[id number]]],PROVIDER:[TOKEN:[[id number]],SCHEDULEDAPPT:[11/26/2024],SCHEDULEDAPPTTIME:[10:30 AM],ESTABLISHEDPATIENT:[T]],PROVIDER:[TOKEN:[[id number]],FOLLOWUP:[1 month]]

## 2024-11-08 NOTE — DISCHARGE NOTE PROVIDER - HOSPITAL COURSE
***INCOMPLETE***    58 YO F, +family history of CAD (father) with PMHx of AFIB (on Toprol and Eliquis last dose 11/7), questionable CVA in 2022 after diagnosis of AFIB (no neuro deficits), moderate rheumatic mitral stenosis, HTN, HLD presents to [facility] c/o palpitations and SOB x2 days. Reports SOB has been worsening in severity, brought on with minimal exertion and relieved with rest. Also reports increased palpitations  Patient denies chest pain, dizziness, LOC, N/V/D, fever/chills/sick contact, diaphoresis, orthopnea/PND, and leg swelling. Patient was seen at outpatient cards  office yesterday where she was found to be in AFIB rate of 110s and slightly volume overloaded. Patient was changed from Toprol 50mg QD to Lopressor 50mg BID and sent home after HR dropped back to the 80s. This morning patient woke up feeling palpitations and short of breath when her daughter called EP physician Dr. Horowitz where he informed patient to come by the ER for evaluation. In the ED, Labs significant for BNP 4131, Trop T 15. Patient remains in a rate controlled AFIB, stable SBPs and without acute complaints. No medications given in ED. EP and CTSx consulted and are following. Patient admitted to cardiac tele for management of atrial fibrillation and work up of mitral stenosis with plans for TTE, SILVERIO and DCCV.      Hospital Course:    TTE 11/7/24: Normal left and right ventricular size and systolic function. EF 60-65% Severely dilated left atrium. The mitral leaflets are thickened with diastolic doming and reduced excursion consistent with rheumatic mitral disease. There is moderate mitral stenosis. The mean transvalvular gradient is 9.00 mmHg at a heart rate of 97 bpm. The mitral valve area estimated via planimetry is 1.61 cm². There is mild-to-moderate mitral regurgitation. Aortic sclerosis without significant stenosis. Trace aortic regurgitation. Mild-to-moderate tricuspid regurgitation. No evidence of pulmonary hypertension, pulmonary artery systolic pressure is 28 mmHg.   SILVERIO 11/7/24: Normal left and right ventricular size and systolic function. Severely dilated left atrium. No LA/RA/LYLY/RAA thrombus seen. The mitral valve is moderately thickened. The posterior leaflet is mildly restricted. Mitral valve is likely rhuematic. There is a 0.7 cm x0.2 cm mobile echodensity on the atrial side of the anterior leaflet of the mitral valve, probably representing fibrinous material, part of "wear and tear" of the valve. There is moderate mitral stenosis. The mean transvalvular gradient is 8.00 mmHg at a heart rate of 86 bpm. The mitral valve area estimated via planimetry is 1.70 cm². There is mild-to-moderate mitral regurgitation. Fibrocalcific tricuspid aortic valve without significant stenosis. There is trace aortic regurgitation. There is mild pulmonary hypertension, pulmonary artery systolic pressure is 48 mmHg.     EP was consulted and pt underwent successful DCCV to NSR.  Per EP recs, pt to continue Lopressor 50mg BID, Eliquis 5mg BID, and to start Amiodarone 400mg BID x 3 weeks and then decrease dose to 200mg once daily.      Pt also evaluated by Structural Heart Team for Mitral Valve Stenosis and is recommended to follow up with Dr Lam in 1 month.      Pt seen and examined this morning, offers no complaints at this time.  VSS/maintaining NSR.  Labs, telemetry, and medications reviewed with Dr Daily. Pt has been cleared for discharge home today with outpatient follow-up with Cardiologist Dr Hallman, EP Dr Horowitz, and CTS SH Dr Lam.      All prescriptions e-prescribed to patients pharmacy.      CARDIAC MEDS:  Eliquis 5mg PO BID  Metoprolol Tartrate 50mg PO BID  Crestor 10mg PO Daily  Amiodarone 400mg PO BID x 3 weeks through 11/29 and then 200mg once daily thereafter.           ***INCOMPLETE***    60 YO F, +family history of CAD (father) with PMHx of AFIB (on Toprol and Eliquis last dose 11/7), questionable CVA in 2022 after diagnosis of AFIB (no neuro deficits), moderate rheumatic mitral stenosis, HTN, HLD presents to [facility] c/o palpitations and SOB x2 days. Reports SOB has been worsening in severity, brought on with minimal exertion and relieved with rest. Also reports increased palpitations  Patient denies chest pain, dizziness, LOC, N/V/D, fever/chills/sick contact, diaphoresis, orthopnea/PND, and leg swelling. Patient was seen at outpatient cards  office yesterday where she was found to be in AFIB rate of 110s and slightly volume overloaded. Patient was changed from Toprol 50mg QD to Lopressor 50mg BID and sent home after HR dropped back to the 80s. This morning patient woke up feeling palpitations and short of breath when her daughter called EP physician Dr. Horowitz where he informed patient to come by the ER for evaluation. In the ED, Labs significant for BNP 4131, Trop T 15. Patient remains in a rate controlled AFIB, stable SBPs and without acute complaints. No medications given in ED. EP and CTSx consulted and are following. Patient admitted to cardiac tele for management of atrial fibrillation and work up of mitral stenosis with plans for TTE, SILVERIO and DCCV.      Hospital Course:    TTE 11/7/24: Normal left and right ventricular size and systolic function. EF 60-65% Severely dilated left atrium. The mitral leaflets are thickened with diastolic doming and reduced excursion consistent with rheumatic mitral disease. There is moderate mitral stenosis. The mean transvalvular gradient is 9.00 mmHg at a heart rate of 97 bpm. The mitral valve area estimated via planimetry is 1.61 cm². There is mild-to-moderate mitral regurgitation. Aortic sclerosis without significant stenosis. Trace aortic regurgitation. Mild-to-moderate tricuspid regurgitation. No evidence of pulmonary hypertension, pulmonary artery systolic pressure is 28 mmHg.   SILVERIO 11/7/24: Normal left and right ventricular size and systolic function. Severely dilated left atrium. No LA/RA/LYLY/RAA thrombus seen. The mitral valve is moderately thickened. The posterior leaflet is mildly restricted. Mitral valve is likely rhuematic. There is a 0.7 cm x0.2 cm mobile echodensity on the atrial side of the anterior leaflet of the mitral valve, probably representing fibrinous material, part of "wear and tear" of the valve. There is moderate mitral stenosis. The mean transvalvular gradient is 8.00 mmHg at a heart rate of 86 bpm. The mitral valve area estimated via planimetry is 1.70 cm². There is mild-to-moderate mitral regurgitation. Fibrocalcific tricuspid aortic valve without significant stenosis. There is trace aortic regurgitation. There is mild pulmonary hypertension, pulmonary artery systolic pressure is 48 mmHg.     EP was consulted and pt underwent successful DCCV to NSR.  Per EP recs, pt to continue Lopressor 50mg BID and to start Amiodarone 400mg Daily x 3 weeks and then decrease dose to 200 mg once daily.  Patient to remain on Eliquis 5 mg BID per outpatient cardiologist Dr. Hallman.     Pt also evaluated by Structural Heart Team for Mitral Valve Stenosis and is recommended to follow up with Dr Lam in 1 month.      Pt seen and examined this morning, offers no complaints at this time.  VSS/maintaining NSR.  Labs, telemetry, and medications reviewed with Dr Daily. Pt has been cleared for discharge home today with outpatient follow-up with Cardiologist Dr Hallman, EP Dr Horowitz, and CTS SH Dr Lam.      All prescriptions e-prescribed to patients pharmacy.      CARDIAC MEDS:  Eliquis 5mg PO BID  Metoprolol Tartrate 50mg PO BID  Crestor 10mg PO Daily  Amiodarone 400mg PO Daily x 3 weeks through 11/29 and then 200mg once daily thereafter.             58 YO F, +family history of CAD (father) with PMHx of AFIB (on Toprol and Eliquis last dose 11/7), questionable CVA in 2022 after diagnosis of AFIB (no neuro deficits), moderate rheumatic mitral stenosis, HTN, HLD presents to [facility] c/o palpitations and SOB x2 days. Reports SOB has been worsening in severity, brought on with minimal exertion and relieved with rest. Also reports increased palpitations  Patient denies chest pain, dizziness, LOC, N/V/D, fever/chills/sick contact, diaphoresis, orthopnea/PND, and leg swelling. Patient was seen at outpatient cards  office yesterday where she was found to be in AFIB rate of 110s and slightly volume overloaded. Patient was changed from Toprol 50mg QD to Lopressor 50mg BID and sent home after HR dropped back to the 80s. This morning patient woke up feeling palpitations and short of breath when her daughter called EP physician Dr. Horowitz where he informed patient to come by the ER for evaluation. In the ED, Labs significant for BNP 4131, Trop T 15. Patient remains in a rate controlled AFIB, stable SBPs and without acute complaints. No medications given in ED. EP and CTSx consulted and are following. Patient admitted to cardiac tele for management of atrial fibrillation and work up of mitral stenosis with plans for TTE, SILVERIO and DCCV.      Hospital Course:    TTE 11/7/24: Normal left and right ventricular size and systolic function. EF 60-65% Severely dilated left atrium. The mitral leaflets are thickened with diastolic doming and reduced excursion consistent with rheumatic mitral disease. There is moderate mitral stenosis. The mean transvalvular gradient is 9.00 mmHg at a heart rate of 97 bpm. The mitral valve area estimated via planimetry is 1.61 cm². There is mild-to-moderate mitral regurgitation. Aortic sclerosis without significant stenosis. Trace aortic regurgitation. Mild-to-moderate tricuspid regurgitation. No evidence of pulmonary hypertension, pulmonary artery systolic pressure is 28 mmHg.   SILVERIO 11/7/24: Normal left and right ventricular size and systolic function. Severely dilated left atrium. No LA/RA/LYYL/RAA thrombus seen. The mitral valve is moderately thickened. The posterior leaflet is mildly restricted. Mitral valve is likely rhuematic. There is a 0.7 cm x0.2 cm mobile echodensity on the atrial side of the anterior leaflet of the mitral valve, probably representing fibrinous material, part of "wear and tear" of the valve. There is moderate mitral stenosis. The mean transvalvular gradient is 8.00 mmHg at a heart rate of 86 bpm. The mitral valve area estimated via planimetry is 1.70 cm². There is mild-to-moderate mitral regurgitation. Fibrocalcific tricuspid aortic valve without significant stenosis. There is trace aortic regurgitation. There is mild pulmonary hypertension, pulmonary artery systolic pressure is 48 mmHg.     EP was consulted and pt underwent successful DCCV to NSR.  Per EP recs, pt to continue Lopressor 50mg BID and to start Amiodarone 400mg Daily x 3 weeks and then decrease dose to 200 mg once daily.  Patient to remain on Eliquis 5 mg BID per outpatient cardiologist Dr. Hallman.     Pt also evaluated by Structural Heart Team for Mitral Valve Stenosis and is recommended to follow up with Dr Lam in 1 month.      Pt seen and examined this morning, offers no complaints at this time.  VSS/maintaining NSR.  Labs, telemetry, and medications reviewed with Dr Daily. Pt has been cleared for discharge home today with outpatient follow-up with Cardiologist Dr Hallman, EP Dr Horowitz, and CTS  Dr Lam.      All prescriptions e-prescribed to patients pharmacy.      CARDIAC MEDS:  Eliquis 5mg PO BID  Metoprolol Tartrate 50mg PO BID  Crestor 10mg PO Daily  Amiodarone 400mg PO Daily x 3 weeks through 11/29 and then 200mg once daily thereafter.

## 2024-11-08 NOTE — DISCHARGE NOTE PROVIDER - CARE PROVIDER_API CALL
Kulwinder Hallman  Cardiology  [address] [place] [address]  Phone: [phone]  Fax: [phone]  Follow Up Time:     Pancho Horowitz  Cardiac Electrophysiology  [address] [place] [address]  Phone: [phone]  Fax: [phone]  Established Patient  Scheduled Appointment: 11/26/2024 10:30 AM    Mathieu Lam  Interventional Cardiology  [address]  [place] [address]  Phone: [phone]  Fax: [phone]  Follow Up Time: 1 month

## 2024-11-08 NOTE — PROVIDER CONTACT NOTE (CRITICAL VALUE NOTIFICATION) - TEST AND RESULT REPORTED:
PTT greater than 200
aPTT >200
Fusiform Excision Additional Text (Leave Blank If You Do Not Want): The margin was drawn around the clinically apparent lesion.  A fusiform shape was then drawn on the skin incorporating the lesion and margins.  Incisions were then made along these lines to the appropriate tissue plane and the lesion was extirpated.

## 2024-11-08 NOTE — PROGRESS NOTE ADULT - NS ATTEND AMEND GEN_ALL_CORE FT
59F PMH HTN, HLD, aflutter s/p DCCV x1 and ablation (9/2022), asthma, rheumatic mitral stenosis, ovarian cancer (2006, in remission) who presented to ED with new afib s/p SILVERIO/DCCV on 11/7. remained in SR overnight. continue noac, start amiodarone load, continue bb. f/u with EP in clinic.

## 2024-11-11 ENCOUNTER — RX RENEWAL (OUTPATIENT)
Age: 59
End: 2024-11-11

## 2024-11-11 ENCOUNTER — NON-APPOINTMENT (OUTPATIENT)
Age: 59
End: 2024-11-11

## 2024-11-12 DIAGNOSIS — E78.5 HYPERLIPIDEMIA, UNSPECIFIED: ICD-10-CM

## 2024-11-12 DIAGNOSIS — J45.909 UNSPECIFIED ASTHMA, UNCOMPLICATED: ICD-10-CM

## 2024-11-12 DIAGNOSIS — I70.0 ATHEROSCLEROSIS OF AORTA: ICD-10-CM

## 2024-11-12 DIAGNOSIS — I48.92 UNSPECIFIED ATRIAL FLUTTER: ICD-10-CM

## 2024-11-12 DIAGNOSIS — I10 ESSENTIAL (PRIMARY) HYPERTENSION: ICD-10-CM

## 2024-11-12 DIAGNOSIS — I48.19 OTHER PERSISTENT ATRIAL FIBRILLATION: ICD-10-CM

## 2024-11-12 DIAGNOSIS — Z79.01 LONG TERM (CURRENT) USE OF ANTICOAGULANTS: ICD-10-CM

## 2024-11-12 DIAGNOSIS — Z85.43 PERSONAL HISTORY OF MALIGNANT NEOPLASM OF OVARY: ICD-10-CM

## 2024-11-12 DIAGNOSIS — I48.0 PAROXYSMAL ATRIAL FIBRILLATION: ICD-10-CM

## 2024-11-12 DIAGNOSIS — I08.1 RHEUMATIC DISORDERS OF BOTH MITRAL AND TRICUSPID VALVES: ICD-10-CM

## 2024-11-13 PROBLEM — I05.0 RHEUMATIC MITRAL STENOSIS: Chronic | Status: ACTIVE | Noted: 2024-11-07

## 2024-11-13 LAB
CULTURE RESULTS: SIGNIFICANT CHANGE UP
SPECIMEN SOURCE: SIGNIFICANT CHANGE UP

## 2024-11-14 LAB
25(OH)D3 SERPL-MCNC: 23 NG/ML
ALBUMIN SERPL ELPH-MCNC: 4.6 G/DL
ALP BLD-CCNC: 183 U/L
ALT SERPL-CCNC: 52 U/L
ANION GAP SERPL CALC-SCNC: 16 MMOL/L
AST SERPL-CCNC: 56 U/L
BASOPHILS # BLD AUTO: 0.02 K/UL
BASOPHILS NFR BLD AUTO: 0.2 %
BILIRUB SERPL-MCNC: 0.4 MG/DL
BUN SERPL-MCNC: 21 MG/DL
CALCIUM SERPL-MCNC: 10.4 MG/DL
CHLORIDE SERPL-SCNC: 101 MMOL/L
CHOLEST SERPL-MCNC: 261 MG/DL
CO2 SERPL-SCNC: 23 MMOL/L
CREAT SERPL-MCNC: 1.21 MG/DL
EGFR: 51 ML/MIN/1.73M2
EOSINOPHIL # BLD AUTO: 0.01 K/UL
EOSINOPHIL NFR BLD AUTO: 0.1 %
ESTIMATED AVERAGE GLUCOSE: 126 MG/DL
FOLATE SERPL-MCNC: 4.5 NG/ML
GLUCOSE SERPL-MCNC: 85 MG/DL
HBA1C MFR BLD HPLC: 6 %
HCT VFR BLD CALC: 44.6 %
HDLC SERPL-MCNC: 71 MG/DL
HGB BLD-MCNC: 13.9 G/DL
IMM GRANULOCYTES NFR BLD AUTO: 0.3 %
LDLC SERPL CALC-MCNC: 145 MG/DL
LYMPHOCYTES # BLD AUTO: 1.58 K/UL
LYMPHOCYTES NFR BLD AUTO: 16 %
MAN DIFF?: NORMAL
MCHC RBC-ENTMCNC: 29.3 PG
MCHC RBC-ENTMCNC: 31.2 G/DL
MCV RBC AUTO: 94.1 FL
MONOCYTES # BLD AUTO: 0.74 K/UL
MONOCYTES NFR BLD AUTO: 7.5 %
NEUTROPHILS # BLD AUTO: 7.49 K/UL
NEUTROPHILS NFR BLD AUTO: 75.9 %
NONHDLC SERPL-MCNC: 190 MG/DL
NT-PROBNP SERPL-MCNC: 4111 PG/ML
PLATELET # BLD AUTO: 285 K/UL
POTASSIUM SERPL-SCNC: 4.2 MMOL/L
PROT SERPL-MCNC: 6.9 G/DL
RBC # BLD: 4.74 M/UL
RBC # FLD: 15.3 %
SODIUM SERPL-SCNC: 140 MMOL/L
T3 SERPL-MCNC: 50 NG/DL
T3FREE SERPL-MCNC: 1.66 PG/ML
T3RU NFR SERPL: 1.1 TBI
T4 FREE SERPL-MCNC: 1 NG/DL
T4 SERPL-MCNC: 5.6 UG/DL
TRIGL SERPL-MCNC: 247 MG/DL
TSH SERPL-ACNC: 1.46 UIU/ML
VIT B12 SERPL-MCNC: 554 PG/ML
WBC # FLD AUTO: 9.87 K/UL

## 2024-11-15 ENCOUNTER — APPOINTMENT (OUTPATIENT)
Dept: HEART AND VASCULAR | Facility: CLINIC | Age: 59
End: 2024-11-15

## 2024-11-18 ENCOUNTER — APPOINTMENT (OUTPATIENT)
Dept: HEART AND VASCULAR | Facility: CLINIC | Age: 59
End: 2024-11-18
Payer: COMMERCIAL

## 2024-11-18 ENCOUNTER — NON-APPOINTMENT (OUTPATIENT)
Age: 59
End: 2024-11-18

## 2024-11-18 VITALS
BODY MASS INDEX: 38.46 KG/M2 | WEIGHT: 209 LBS | HEIGHT: 62 IN | HEART RATE: 53 BPM | DIASTOLIC BLOOD PRESSURE: 85 MMHG | RESPIRATION RATE: 14 BRPM | SYSTOLIC BLOOD PRESSURE: 130 MMHG

## 2024-11-18 DIAGNOSIS — I10 ESSENTIAL (PRIMARY) HYPERTENSION: ICD-10-CM

## 2024-11-18 DIAGNOSIS — Z09 ENCOUNTER FOR FOLLOW-UP EXAMINATION AFTER COMPLETED TREATMENT FOR CONDITIONS OTHER THAN MALIGNANT NEOPLASM: ICD-10-CM

## 2024-11-18 DIAGNOSIS — I05.2 RHEUMATIC MITRAL STENOSIS WITH INSUFFICIENCY: ICD-10-CM

## 2024-11-18 DIAGNOSIS — I48.91 UNSPECIFIED ATRIAL FIBRILLATION: ICD-10-CM

## 2024-11-18 PROCEDURE — 93000 ELECTROCARDIOGRAM COMPLETE: CPT

## 2024-11-18 PROCEDURE — 90662 IIV NO PRSV INCREASED AG IM: CPT

## 2024-11-18 PROCEDURE — G0008: CPT

## 2024-11-18 PROCEDURE — 99495 TRANSJ CARE MGMT MOD F2F 14D: CPT

## 2024-11-18 PROCEDURE — 99214 OFFICE O/P EST MOD 30 MIN: CPT | Mod: 25

## 2024-11-18 RX ORDER — AMIODARONE HYDROCHLORIDE 200 MG/1
200 TABLET ORAL
Qty: 90 | Refills: 3 | Status: ACTIVE | COMMUNITY
Start: 2024-11-18

## 2024-11-20 ENCOUNTER — RX RENEWAL (OUTPATIENT)
Age: 59
End: 2024-11-20

## 2024-11-25 ENCOUNTER — RX RENEWAL (OUTPATIENT)
Age: 59
End: 2024-11-25

## 2024-12-12 ENCOUNTER — RX RENEWAL (OUTPATIENT)
Age: 59
End: 2024-12-12

## 2024-12-13 RX ORDER — AZITHROMYCIN 250 MG/1
250 TABLET, FILM COATED ORAL
Qty: 1 | Refills: 0 | Status: ACTIVE | COMMUNITY
Start: 2024-12-13 | End: 1900-01-01

## 2024-12-24 ENCOUNTER — APPOINTMENT (OUTPATIENT)
Dept: HEART AND VASCULAR | Facility: CLINIC | Age: 59
End: 2024-12-24
Payer: COMMERCIAL

## 2024-12-24 ENCOUNTER — NON-APPOINTMENT (OUTPATIENT)
Age: 59
End: 2024-12-24

## 2024-12-24 VITALS
SYSTOLIC BLOOD PRESSURE: 135 MMHG | WEIGHT: 208 LBS | BODY MASS INDEX: 38.28 KG/M2 | HEART RATE: 59 BPM | RESPIRATION RATE: 14 BRPM | HEIGHT: 62 IN | DIASTOLIC BLOOD PRESSURE: 85 MMHG

## 2024-12-24 DIAGNOSIS — E54 ASCORBIC ACID DEFICIENCY: ICD-10-CM

## 2024-12-24 DIAGNOSIS — I10 ESSENTIAL (PRIMARY) HYPERTENSION: ICD-10-CM

## 2024-12-24 DIAGNOSIS — I48.91 UNSPECIFIED ATRIAL FIBRILLATION: ICD-10-CM

## 2024-12-24 DIAGNOSIS — I05.2 RHEUMATIC MITRAL STENOSIS WITH INSUFFICIENCY: ICD-10-CM

## 2024-12-24 PROCEDURE — 93000 ELECTROCARDIOGRAM COMPLETE: CPT

## 2024-12-24 PROCEDURE — 99214 OFFICE O/P EST MOD 30 MIN: CPT

## 2024-12-24 PROCEDURE — 36415 COLL VENOUS BLD VENIPUNCTURE: CPT

## 2024-12-24 PROCEDURE — G2211 COMPLEX E/M VISIT ADD ON: CPT

## 2024-12-24 RX ORDER — METOPROLOL TARTRATE 50 MG/1
50 TABLET ORAL
Qty: 180 | Refills: 3 | Status: ACTIVE | COMMUNITY
Start: 2024-12-24 | End: 1900-01-01

## 2024-12-26 LAB
ALBUMIN SERPL ELPH-MCNC: 4 G/DL
ALP BLD-CCNC: 138 U/L
ALT SERPL-CCNC: 21 U/L
ANION GAP SERPL CALC-SCNC: 13 MMOL/L
AST SERPL-CCNC: 21 U/L
BASOPHILS # BLD AUTO: 0.05 K/UL
BASOPHILS NFR BLD AUTO: 0.7 %
BILIRUB SERPL-MCNC: 0.4 MG/DL
BUN SERPL-MCNC: 14 MG/DL
CALCIUM SERPL-MCNC: 9.5 MG/DL
CHLORIDE SERPL-SCNC: 103 MMOL/L
CO2 SERPL-SCNC: 24 MMOL/L
CREAT SERPL-MCNC: 0.99 MG/DL
EGFR: 65 ML/MIN/1.73M2
EOSINOPHIL # BLD AUTO: 0.18 K/UL
EOSINOPHIL NFR BLD AUTO: 2.7 %
GLUCOSE SERPL-MCNC: 172 MG/DL
HCT VFR BLD CALC: 39.5 %
HGB BLD-MCNC: 12.9 G/DL
IMM GRANULOCYTES NFR BLD AUTO: 0.3 %
LYMPHOCYTES # BLD AUTO: 1.94 K/UL
LYMPHOCYTES NFR BLD AUTO: 28.8 %
MAN DIFF?: NORMAL
MCHC RBC-ENTMCNC: 29.8 PG
MCHC RBC-ENTMCNC: 32.7 G/DL
MCV RBC AUTO: 91.2 FL
MONOCYTES # BLD AUTO: 0.45 K/UL
MONOCYTES NFR BLD AUTO: 6.7 %
NEUTROPHILS # BLD AUTO: 4.1 K/UL
NEUTROPHILS NFR BLD AUTO: 60.8 %
PLATELET # BLD AUTO: 236 K/UL
POTASSIUM SERPL-SCNC: 4.3 MMOL/L
PROT SERPL-MCNC: 5.9 G/DL
RBC # BLD: 4.33 M/UL
RBC # FLD: 14.6 %
SODIUM SERPL-SCNC: 140 MMOL/L
TSH SERPL-ACNC: 1.61 UIU/ML
WBC # FLD AUTO: 6.74 K/UL

## 2025-01-02 ENCOUNTER — RX RENEWAL (OUTPATIENT)
Age: 60
End: 2025-01-02

## 2025-01-02 DIAGNOSIS — E78.5 HYPERLIPIDEMIA, UNSPECIFIED: ICD-10-CM

## 2025-01-16 RX ORDER — OMEPRAZOLE 40 MG/1
40 CAPSULE, DELAYED RELEASE ORAL
Qty: 90 | Refills: 3 | Status: ACTIVE | COMMUNITY
Start: 2025-01-16 | End: 1900-01-01

## 2025-02-25 DIAGNOSIS — Z00.00 ENCOUNTER FOR GENERAL ADULT MEDICAL EXAMINATION W/OUT ABNORMAL FINDINGS: ICD-10-CM

## 2025-02-27 LAB
AMPHET UR-MCNC: NEGATIVE NG/ML
BARBITURATES UR-MCNC: NEGATIVE NG/ML
BENZODIAZ UR-MCNC: NEGATIVE NG/ML
COCAINE METAB.OTHER UR-MCNC: NEGATIVE NG/ML
CREATININE, URINE: 137.6 MG/DL
FENTANYL, URINE: NEGATIVE NG/ML
METHADONE SCREEN, UR: NEGATIVE NG/ML
OPIATES UR-MCNC: NEGATIVE NG/ML
OXYCODONE/OXYMORPHONE, URINE: NEGATIVE NG/ML
PCP UR-MCNC: NEGATIVE NG/ML
PH, URINE: 5.4
THC UR QL: NEGATIVE NG/ML

## 2025-03-02 LAB
M TB IFN-G BLD-IMP: NEGATIVE
QUANTIFERON TB PLUS MITOGEN MINUS NIL: >10 IU/ML
QUANTIFERON TB PLUS NIL: 0.03 IU/ML
QUANTIFERON TB PLUS TB1 MINUS NIL: 0.02 IU/ML
QUANTIFERON TB PLUS TB2 MINUS NIL: 0 IU/ML

## 2025-03-24 ENCOUNTER — APPOINTMENT (OUTPATIENT)
Dept: OTOLARYNGOLOGY | Facility: CLINIC | Age: 60
End: 2025-03-24
Payer: COMMERCIAL

## 2025-03-24 DIAGNOSIS — R05.9 COUGH, UNSPECIFIED: ICD-10-CM

## 2025-03-24 DIAGNOSIS — R09.81 NASAL CONGESTION: ICD-10-CM

## 2025-03-24 DIAGNOSIS — G47.30 SLEEP APNEA, UNSPECIFIED: ICD-10-CM

## 2025-03-24 PROCEDURE — 99204 OFFICE O/P NEW MOD 45 MIN: CPT | Mod: 25

## 2025-03-24 PROCEDURE — 31231 NASAL ENDOSCOPY DX: CPT

## 2025-03-24 RX ORDER — FAMOTIDINE 40 MG/1
40 TABLET, FILM COATED ORAL
Qty: 60 | Refills: 3 | Status: ACTIVE | COMMUNITY
Start: 2025-03-24 | End: 1900-01-01

## 2025-03-24 RX ORDER — LEVOCETIRIZINE DIHYDROCHLORIDE 5 MG/1
5 TABLET ORAL DAILY
Qty: 30 | Refills: 1 | Status: ACTIVE | COMMUNITY
Start: 2025-03-24 | End: 1900-01-01

## 2025-03-24 RX ORDER — FLUTICASONE PROPIONATE 50 UG/1
50 SPRAY, METERED NASAL DAILY
Qty: 1 | Refills: 6 | Status: ACTIVE | COMMUNITY
Start: 2025-03-24 | End: 1900-01-01

## 2025-03-31 DIAGNOSIS — R06.83 SNORING: ICD-10-CM

## 2025-04-21 ENCOUNTER — APPOINTMENT (OUTPATIENT)
Dept: HEART AND VASCULAR | Facility: CLINIC | Age: 60
End: 2025-04-21
Payer: COMMERCIAL

## 2025-04-21 ENCOUNTER — NON-APPOINTMENT (OUTPATIENT)
Age: 60
End: 2025-04-21

## 2025-04-21 VITALS
BODY MASS INDEX: 43.39 KG/M2 | RESPIRATION RATE: 14 BRPM | HEIGHT: 60 IN | DIASTOLIC BLOOD PRESSURE: 100 MMHG | WEIGHT: 221 LBS | SYSTOLIC BLOOD PRESSURE: 144 MMHG

## 2025-04-21 DIAGNOSIS — I05.2 RHEUMATIC MITRAL STENOSIS WITH INSUFFICIENCY: ICD-10-CM

## 2025-04-21 DIAGNOSIS — E78.5 HYPERLIPIDEMIA, UNSPECIFIED: ICD-10-CM

## 2025-04-21 DIAGNOSIS — M79.606 PAIN IN LEG, UNSPECIFIED: ICD-10-CM

## 2025-04-21 DIAGNOSIS — I48.91 UNSPECIFIED ATRIAL FIBRILLATION: ICD-10-CM

## 2025-04-21 DIAGNOSIS — I10 ESSENTIAL (PRIMARY) HYPERTENSION: ICD-10-CM

## 2025-04-21 DIAGNOSIS — E66.01 MORBID (SEVERE) OBESITY DUE TO EXCESS CALORIES: ICD-10-CM

## 2025-04-21 PROCEDURE — 36415 COLL VENOUS BLD VENIPUNCTURE: CPT

## 2025-04-21 PROCEDURE — 93000 ELECTROCARDIOGRAM COMPLETE: CPT

## 2025-04-21 PROCEDURE — G2211 COMPLEX E/M VISIT ADD ON: CPT

## 2025-04-21 PROCEDURE — 99214 OFFICE O/P EST MOD 30 MIN: CPT

## 2025-04-23 LAB
ALBUMIN SERPL ELPH-MCNC: 4.3 G/DL
ALP BLD-CCNC: 166 U/L
ALT SERPL-CCNC: 21 U/L
AMPHET UR-MCNC: NEGATIVE NG/ML
ANION GAP SERPL CALC-SCNC: 16 MMOL/L
AST SERPL-CCNC: 25 U/L
BARBITURATES UR-MCNC: NEGATIVE NG/ML
BASOPHILS # BLD AUTO: 0.06 K/UL
BASOPHILS NFR BLD AUTO: 0.9 %
BENZODIAZ UR-MCNC: NEGATIVE NG/ML
BILIRUB SERPL-MCNC: 0.4 MG/DL
BUN SERPL-MCNC: 15 MG/DL
CALCIUM SERPL-MCNC: 9.8 MG/DL
CHLORIDE SERPL-SCNC: 105 MMOL/L
CO2 SERPL-SCNC: 19 MMOL/L
COCAINE METAB.OTHER UR-MCNC: NEGATIVE NG/ML
CREAT SERPL-MCNC: 1.05 MG/DL
CREATININE, URINE: 152.9 MG/DL
EGFRCR SERPLBLD CKD-EPI 2021: 60 ML/MIN/1.73M2
EOSINOPHIL # BLD AUTO: 0.31 K/UL
EOSINOPHIL NFR BLD AUTO: 4.8 %
FENTANYL, URINE: NEGATIVE NG/ML
GLUCOSE SERPL-MCNC: 74 MG/DL
HCT VFR BLD CALC: 43.6 %
HGB BLD-MCNC: 13.8 G/DL
IMM GRANULOCYTES NFR BLD AUTO: 0.2 %
LYMPHOCYTES # BLD AUTO: 2.11 K/UL
LYMPHOCYTES NFR BLD AUTO: 32.9 %
MAN DIFF?: NORMAL
MCHC RBC-ENTMCNC: 29.4 PG
MCHC RBC-ENTMCNC: 31.7 G/DL
MCV RBC AUTO: 92.8 FL
METHADONE SCREEN, UR: NEGATIVE NG/ML
MONOCYTES # BLD AUTO: 0.49 K/UL
MONOCYTES NFR BLD AUTO: 7.6 %
NEUTROPHILS # BLD AUTO: 3.43 K/UL
NEUTROPHILS NFR BLD AUTO: 53.6 %
OPIATES UR-MCNC: NEGATIVE NG/ML
OXYCODONE/OXYMORPHONE, URINE: NEGATIVE NG/ML
PCP UR-MCNC: NEGATIVE NG/ML
PH, URINE: 5.7
PLATELET # BLD AUTO: 269 K/UL
POTASSIUM SERPL-SCNC: 4.5 MMOL/L
PROT SERPL-MCNC: 6.3 G/DL
RBC # BLD: 4.7 M/UL
RBC # FLD: 14.3 %
SODIUM SERPL-SCNC: 140 MMOL/L
THC UR QL: NEGATIVE NG/ML
TSH SERPL-ACNC: 1.79 UIU/ML
WBC # FLD AUTO: 6.41 K/UL

## 2025-04-24 RX ORDER — TIRZEPATIDE 2.5 MG/.5ML
2.5 INJECTION, SOLUTION SUBCUTANEOUS
Qty: 3 | Refills: 3 | Status: ACTIVE | COMMUNITY
Start: 2025-04-21 | End: 1900-01-01

## 2025-05-19 ENCOUNTER — RX RENEWAL (OUTPATIENT)
Age: 60
End: 2025-05-19

## 2025-06-18 ENCOUNTER — APPOINTMENT (OUTPATIENT)
Facility: CLINIC | Age: 60
End: 2025-06-18

## 2025-07-02 ENCOUNTER — APPOINTMENT (OUTPATIENT)
Facility: CLINIC | Age: 60
End: 2025-07-02
Payer: COMMERCIAL

## 2025-07-02 PROBLEM — M51.26 LUMBAR DISC HERNIATION: Status: ACTIVE | Noted: 2025-07-02

## 2025-07-02 PROBLEM — M25.561 CHRONIC PAIN OF RIGHT KNEE: Status: ACTIVE | Noted: 2025-07-02

## 2025-07-02 PROCEDURE — 99204 OFFICE O/P NEW MOD 45 MIN: CPT

## 2025-08-06 ENCOUNTER — APPOINTMENT (OUTPATIENT)
Facility: CLINIC | Age: 60
End: 2025-08-06

## 2025-08-06 DIAGNOSIS — M47.816 SPONDYLOSIS W/OUT MYELOPATHY OR RADICULOPATHY, LUMBAR REGION: ICD-10-CM

## 2025-08-13 ENCOUNTER — NON-APPOINTMENT (OUTPATIENT)
Age: 60
End: 2025-08-13

## 2025-08-13 ENCOUNTER — APPOINTMENT (OUTPATIENT)
Facility: CLINIC | Age: 60
End: 2025-08-13
Payer: MEDICAID

## 2025-08-13 VITALS — WEIGHT: 220 LBS | BODY MASS INDEX: 43.19 KG/M2 | RESPIRATION RATE: 14 BRPM | HEIGHT: 60 IN

## 2025-08-13 PROBLEM — M17.11 PRIMARY OSTEOARTHRITIS OF RIGHT KNEE: Status: ACTIVE | Noted: 2025-07-02

## 2025-08-13 PROCEDURE — 20611 DRAIN/INJ JOINT/BURSA W/US: CPT | Mod: RT

## 2025-08-13 PROCEDURE — 99214 OFFICE O/P EST MOD 30 MIN: CPT | Mod: 25

## 2025-08-20 ENCOUNTER — APPOINTMENT (OUTPATIENT)
Facility: CLINIC | Age: 60
End: 2025-08-20

## 2025-08-20 VITALS — WEIGHT: 220 LBS | HEIGHT: 60 IN | BODY MASS INDEX: 43.19 KG/M2

## 2025-08-20 DIAGNOSIS — M17.11 UNILATERAL PRIMARY OSTEOARTHRITIS, RIGHT KNEE: ICD-10-CM

## 2025-08-20 PROCEDURE — 20611 DRAIN/INJ JOINT/BURSA W/US: CPT | Mod: RT

## 2025-09-03 ENCOUNTER — NON-APPOINTMENT (OUTPATIENT)
Age: 60
End: 2025-09-03

## 2025-09-03 ENCOUNTER — APPOINTMENT (OUTPATIENT)
Facility: CLINIC | Age: 60
End: 2025-09-03
Payer: MEDICAID

## 2025-09-03 DIAGNOSIS — M17.11 UNILATERAL PRIMARY OSTEOARTHRITIS, RIGHT KNEE: ICD-10-CM

## 2025-09-03 PROCEDURE — 20611 DRAIN/INJ JOINT/BURSA W/US: CPT | Mod: RT
